# Patient Record
Sex: MALE | Race: WHITE | HISPANIC OR LATINO | Employment: UNEMPLOYED | URBAN - METROPOLITAN AREA
[De-identification: names, ages, dates, MRNs, and addresses within clinical notes are randomized per-mention and may not be internally consistent; named-entity substitution may affect disease eponyms.]

---
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
## 2024-01-10 ENCOUNTER — HOSPITAL ENCOUNTER (OUTPATIENT)
Dept: RESEARCH | Facility: CLINIC | Age: 19
Discharge: HOME | End: 2024-01-10
Attending: INTERNAL MEDICINE
Payer: COMMERCIAL

## 2024-01-10 PROCEDURE — 300N000003 HC RESEARCH SPECIMEN PROCESSING, SIMPLE

## 2024-01-10 PROCEDURE — 510N000009 HC RESEARCH FACILITY, PER 15 MIN

## 2024-01-10 PROCEDURE — 510N000017 HC CRU PATIENT CARE, PER 15 MIN

## 2024-01-10 NOTE — ADDENDUM NOTE
Encounter addended by: Lilly Pendleton on: 1/10/2024 3:02 PM   Actions taken: Charge Capture section accepted

## 2024-01-16 ENCOUNTER — MYC MEDICAL ADVICE (OUTPATIENT)
Dept: NEPHROLOGY | Facility: CLINIC | Age: 19
End: 2024-01-16
Payer: COMMERCIAL

## 2024-01-16 DIAGNOSIS — R80.1 PERSISTENT PROTEINURIA: ICD-10-CM

## 2024-01-16 DIAGNOSIS — Q87.81 X-LINKED ALPORT SYNDROME: Primary | ICD-10-CM

## 2024-01-17 ENCOUNTER — LAB (OUTPATIENT)
Dept: LAB | Facility: CLINIC | Age: 19
End: 2024-01-17
Payer: COMMERCIAL

## 2024-01-17 DIAGNOSIS — R80.1 PERSISTENT PROTEINURIA: ICD-10-CM

## 2024-01-17 DIAGNOSIS — Q87.81 X-LINKED ALPORT SYNDROME: ICD-10-CM

## 2024-01-17 LAB
ALBUMIN MFR UR ELPH: 121 MG/DL
ALBUMIN SERPL BCG-MCNC: 4.3 G/DL (ref 3.5–5.2)
ALBUMIN UR-MCNC: 100 MG/DL
ALP SERPL-CCNC: 63 U/L (ref 65–260)
ALT SERPL W P-5'-P-CCNC: 11 U/L (ref 0–50)
ANION GAP SERPL CALCULATED.3IONS-SCNC: 8 MMOL/L (ref 7–15)
APPEARANCE UR: CLEAR
AST SERPL W P-5'-P-CCNC: 24 U/L (ref 0–35)
BASOPHILS # BLD AUTO: 0.1 10E3/UL (ref 0–0.2)
BASOPHILS NFR BLD AUTO: 1 %
BILIRUB SERPL-MCNC: 0.6 MG/DL
BILIRUB UR QL STRIP: NEGATIVE
BUN SERPL-MCNC: 12.9 MG/DL (ref 6–20)
CALCIUM SERPL-MCNC: 9.1 MG/DL (ref 8.6–10)
CHLORIDE SERPL-SCNC: 102 MMOL/L (ref 98–107)
COLOR UR AUTO: ABNORMAL
CREAT SERPL-MCNC: 0.94 MG/DL (ref 0.67–1.17)
CREAT UR-MCNC: 55.3 MG/DL
DEPRECATED HCO3 PLAS-SCNC: 30 MMOL/L (ref 22–29)
EGFRCR SERPLBLD CKD-EPI 2021: >90 ML/MIN/1.73M2
EOSINOPHIL # BLD AUTO: 0.3 10E3/UL (ref 0–0.7)
EOSINOPHIL NFR BLD AUTO: 4 %
ERYTHROCYTE [DISTWIDTH] IN BLOOD BY AUTOMATED COUNT: 12.2 % (ref 10–15)
GLUCOSE SERPL-MCNC: 89 MG/DL (ref 70–99)
GLUCOSE UR STRIP-MCNC: NEGATIVE MG/DL
HCT VFR BLD AUTO: 41.1 % (ref 40–53)
HGB BLD-MCNC: 14.5 G/DL (ref 13.3–17.7)
HGB UR QL STRIP: ABNORMAL
IMM GRANULOCYTES # BLD: 0 10E3/UL
IMM GRANULOCYTES NFR BLD: 0 %
KETONES UR STRIP-MCNC: NEGATIVE MG/DL
LEUKOCYTE ESTERASE UR QL STRIP: NEGATIVE
LYMPHOCYTES # BLD AUTO: 2.2 10E3/UL (ref 0.8–5.3)
LYMPHOCYTES NFR BLD AUTO: 32 %
MCH RBC QN AUTO: 29.8 PG (ref 26.5–33)
MCHC RBC AUTO-ENTMCNC: 35.3 G/DL (ref 31.5–36.5)
MCV RBC AUTO: 84 FL (ref 78–100)
MONOCYTES # BLD AUTO: 0.5 10E3/UL (ref 0–1.3)
MONOCYTES NFR BLD AUTO: 8 %
NEUTROPHILS # BLD AUTO: 3.9 10E3/UL (ref 1.6–8.3)
NEUTROPHILS NFR BLD AUTO: 55 %
NITRATE UR QL: NEGATIVE
NRBC # BLD AUTO: 0 10E3/UL
NRBC BLD AUTO-RTO: 0 /100
PH UR STRIP: 7 [PH] (ref 5–7)
PLATELET # BLD AUTO: 182 10E3/UL (ref 150–450)
POTASSIUM SERPL-SCNC: 3.8 MMOL/L (ref 3.4–5.3)
PROT SERPL-MCNC: 6.3 G/DL (ref 6.4–8.3)
PROT/CREAT 24H UR: 2.19 MG/MG CR (ref 0–0.2)
RBC # BLD AUTO: 4.87 10E6/UL (ref 4.4–5.9)
RBC URINE: 68 /HPF
SODIUM SERPL-SCNC: 140 MMOL/L (ref 135–145)
SP GR UR STRIP: 1.01 (ref 1–1.03)
UROBILINOGEN UR STRIP-MCNC: NORMAL MG/DL
WBC # BLD AUTO: 6.9 10E3/UL (ref 4–11)
WBC URINE: <1 /HPF

## 2024-01-17 PROCEDURE — 36415 COLL VENOUS BLD VENIPUNCTURE: CPT | Performed by: PATHOLOGY

## 2024-01-17 PROCEDURE — 80053 COMPREHEN METABOLIC PANEL: CPT | Performed by: PATHOLOGY

## 2024-01-17 PROCEDURE — 81001 URINALYSIS AUTO W/SCOPE: CPT | Performed by: PATHOLOGY

## 2024-01-17 PROCEDURE — 85025 COMPLETE CBC W/AUTO DIFF WBC: CPT | Performed by: PATHOLOGY

## 2024-01-17 PROCEDURE — 84156 ASSAY OF PROTEIN URINE: CPT | Performed by: PATHOLOGY

## 2024-01-17 NOTE — TELEPHONE ENCOUNTER
Patient reached out wanting additional support on new ADHD meds his outside provider has started him on:   Adderall 10mg take 2 tabs daily or Adderall 20 ER daily (one or the other).  Patient concerned this med may have kidney issues and wondering if Dr. Aguilar has any thoughts on this med versus Ritalin or other option.  Patient has been taking the short acting one for 10days or so and states doing ok with that at this time.  Reviewed last note with patient and Dr. Aguilar's request for labs in Dec, encouraged to get now so we can see his kidney status at this time.   Patient available today to get labs drawn, scheduled [place] [facility] [place] [facility] for 345pm today, confirmed date and time with patient as well as location.    Confirmed with patient that he has not started his Farxiga yet as his insurance has denied it thus far.  Patient is agreeable for additional support to get auth completed.  [facility] referral placed and scheduled appt with Kayleigh Armstrong in am.  Patient agreeable to labs today and pharmacy video visit tomorrow am.  Plan: will [place] back with patient later this week or early next week on lab results and Dr. Aguilar response to patient's request after clinic later this week.  Patient verbalized understanding to directions and agreeable to plan of care.      Reached out to Dr. Aguilar to confirm labs orders to be drawn today.  Updated lab orders to include CBC, CMP to already Protein Random, UA.  Also updated Dr. Aguilar Farxiga has not been started because of barriers on getting auth so set him up with [facility] to get things ironed out.    Faye Trevino RN, BSN, PHN  [facility] Nephrology Nurse Navigator  [phone]

## 2024-01-18 ENCOUNTER — VIRTUAL VISIT (OUTPATIENT)
Dept: RHEUMATOLOGY | Facility: CLINIC | Age: 19
End: 2024-01-18
Attending: INTERNAL MEDICINE
Payer: COMMERCIAL

## 2024-01-18 DIAGNOSIS — Z91.018 FOOD ALLERGY: ICD-10-CM

## 2024-01-18 DIAGNOSIS — L20.9 ATOPIC DERMATITIS, UNSPECIFIED TYPE: ICD-10-CM

## 2024-01-18 DIAGNOSIS — R80.1 PERSISTENT PROTEINURIA, UNSPECIFIED: ICD-10-CM

## 2024-01-18 DIAGNOSIS — Z71.85 VACCINE COUNSELING: ICD-10-CM

## 2024-01-18 DIAGNOSIS — F90.9 ATTENTION DEFICIT HYPERACTIVITY DISORDER (ADHD), UNSPECIFIED ADHD TYPE: ICD-10-CM

## 2024-01-18 DIAGNOSIS — J30.2 SEASONAL ALLERGIC RHINITIS, UNSPECIFIED TRIGGER: ICD-10-CM

## 2024-01-18 DIAGNOSIS — Q87.81 X-LINKED ALPORT SYNDROME: Primary | ICD-10-CM

## 2024-01-18 RX ORDER — DEXTROAMPHETAMINE SACCHARATE, AMPHETAMINE ASPARTATE MONOHYDRATE, DEXTROAMPHETAMINE SULFATE AND AMPHETAMINE SULFATE 5; 5; 5; 5 MG/1; MG/1; MG/1; MG/1
20 CAPSULE, EXTENDED RELEASE ORAL DAILY PRN
COMMUNITY
Start: 2024-01-12

## 2024-01-18 RX ORDER — DEXTROAMPHETAMINE SACCHARATE, AMPHETAMINE ASPARTATE, DEXTROAMPHETAMINE SULFATE AND AMPHETAMINE SULFATE 2.5; 2.5; 2.5; 2.5 MG/1; MG/1; MG/1; MG/1
TABLET ORAL
COMMUNITY
Start: 2024-01-02

## 2024-01-18 RX ORDER — DAPAGLIFLOZIN 5 MG/1
5 TABLET, FILM COATED ORAL DAILY
Qty: 30 TABLET | Refills: 11 | Status: SHIPPED | OUTPATIENT
Start: 2024-01-18

## 2024-01-18 NOTE — PATIENT INSTRUCTIONS
"Recommendations from today's MTM visit:                                                       1. Write down the BIN, PCN, ID number, and Group number from the Farxiga copay card and take this to [facility]. The pharmacy should be able to manually add this information which should decrease they price by $150.    2. Your Adderall is safe to take with your current kidney function. If your kidney function decreases, we may need to decrease your dose.    3. Consider getting your yearly flu vaccine and updated covid booster.    Follow-up: Return in about 6 months (around 7/18/2024) for MTM Pharmacist Visit.    It was great speaking with you today.  I value your experience and would be very thankful for your time in providing feedback in our clinic survey. In the next few days, you may receive an email or text message from [employer] with a link to a survey related to your  clinical pharmacist.\"     To schedule another MTM appointment, please call the clinic directly or you may call the MTM scheduling line at [phone].    My Clinical Pharmacist's contact information:                                                      Please feel free to contact me with any questions or concerns you have.      Kayleigh Armstrong, PharmD  Medication Therapy Management Pharmacist  [facility]  Phone: [phone]     "

## 2024-01-18 NOTE — Clinical Note
1/18/2024       RE: Valentin Gaytan  [address] [facility] [address]     Dear Colleague,    Thank you for referring your patient, Valentin Gaytan, to the [facility] [place] [facility]. Please see a copy of my visit note below.    Medication Therapy Management (MTM) Encounter    ASSESSMENT:                            Medication Adherence/Access: {adherencechoices:708226}    ***:  ***      PLAN:                            ***    Follow-up: {followuptest2:488457}    SUBJECTIVE/OBJECTIVE:                          Valentin Gaytan is a 18 year old male contacted via secure video for an initial visit. He was referred to me from Prasanna Aguilar MD.      Reason for visit: Questions about cost of Farxiga, if Adderall is damaging to renal health    Allergies/ADRs: Reviewed in chart  Past Medical History: Reviewed in chart  Tobacco: He reports that he has never smoked. He does not have any smokeless tobacco history on file.  Alcohol: {ALCOHOL CONSUMPTION HX:026701}  {Social and Goals:965820}    Medication Adherence/Access: Medication barriers: affording medications.     Alport Syndrome/Proteinuria:   Losartan 12.5 mg daily  Farxiga 5 mg daily (not currently taking)    ADHD:  Adderall XR 20 mg daily as needed   Adderall 10 mg 1/2 to 2 tablets twice daily (4 hours apart) as needed     Atopic Dermatitis:  Clobetasone 0.05% shampoo twice weekly  Ketoconazole 2% shampoo twice weekly  Mometasone 0.1% solution twice weekly    Allergic Rhinitis/Food Allergies:   Azelastine nasal spray - 1 spray(s) each nostril twice daily  Cetirizine 20 mg as needed for severe allergic reaction  Epinephrine 0.3 mg as needed for severe allergic reaction  Prednisone 100 mg as needed for severe allergic reaction  Patient reports {:695126}.    {allergy:323928}   Patient feels that current therapy {IS NOT/IS:235205} effective.     Vaccines:  Immunization History   Administered  "Date(s) Administered    COVID-19 Bivalent 12+ (Pfizer) 10/24/2022    COVID-19 MONOVALENT 12+ (Pfizer) 12/13/2021    Influenza Vaccine >6 months,quad, PF 11/15/2022      Today's Vitals: There were no vitals taken for this visit.  ----------------  {ESME?:966982}    I spent {mtm total time 3:229750} with this patient today. { :278288}. A copy of the visit note was provided to the patient's provider(s).    A summary of these recommendations {GIVEN/NOT GIVEN:098111}.    ***    Telemedicine Visit Details  Type of service:  {telemedvisitmtm:725920::\"Telephone visit\"}  Start Time: {video/phone visit start time:152948}  End Time: {video/phone visit end time:152948}     Medication Therapy Recommendations  No medication therapy recommendations to display       Medication Therapy Management (MTM) Encounter    ASSESSMENT:                            Medication Adherence/Access: See below for considerations    Alport Syndrome/Proteinuria: Reviewed use of SGLT-2 inhibitors for chronic kidney disease and persistent proteinuria. Determined both Jardiance and Farxiga are over a $500 cost per month until deductible is met per insurance formulary. Discussed Farxiga copay card and information pharmacy will need to use card. Encouraged patient to write down needed numbers and request the pharmacy manually enter the information to reduce cost by a maximum of $150. Reviewed patient should contact MTM if pharmacy is unable to process copay card and we will look for alternate pharmacy options. Discussed he may consider looking into insurance plan to determine deductible and out of pocket maximum as cost will decrease once these are met.    ADHD:  Reviewed Adderall when used in patients with typical renal function poses no risk of renal toxicity however if it is used in patients that have an underlying renal insufficiency, elimination can be impaired and lower doses may be needed. Discussed that with patients current kidney function, no dose " adjustment is needed. If renal function worsens in future, will need to discuss dose decrease or alternate medication options.    Atopic Dermatitis: Stable.    Allergic Rhinitis/Food Allergies: Stable.    Vaccines: Per ACIP recommendations, eligible for yearly influenza vaccine and updated covid booster.    PLAN:                            1. Write down the BIN, PCN, ID number, and Group number from the Farxiga copay card and take this to [facility]. The pharmacy should be able to manually add this information which should decrease they price by $150.    2. Your Adderall is safe to take with your current kidney function. If your kidney function decreases, we may need to decrease your dose.    3. Consider getting your yearly flu vaccine and updated covid booster.    Follow-up: Return in about 6 months (around 7/18/2024) for MTM Pharmacist Visit.    SUBJECTIVE/OBJECTIVE:                          Valentin Gaytan is a 18 year old male contacted via secure video for an initial visit. He was referred to me from Prasanna Aguilar MD.      Reason for visit: Questions about cost of Farxiga, if Adderall is damaging to renal health    Allergies/ADRs: Reviewed in chart  Past Medical History: Reviewed in chart  Tobacco: He reports that he has never smoked. He does not have any smokeless tobacco history on file.  Alcohol: not currently using, has had alcohol previously    Medication Adherence/Access: Medication barriers: affording medications. Reports Farxiga was over $500 through insurance when he attempted to fill it a few weeks ago. Does have a copay card but pharmacy was unable to use it.     Alport Syndrome/Proteinuria:   Losartan 12.5 mg daily  Farxiga 5 mg daily (not currently taking)    Reports he is doing well on his losartan. Is still a little lightheaded when changing positions quickly but much better tolerated than some of the medications he has tried previously. Was told he should start Farxiga due to  proteinuria but it was very expensive at the pharmacy when he tried to fill it and they were unable to accept the pdf of this coupon card for it. Still on parents insurance which he knows is a high deductible plan. Has not looked at pricing for other SGLT2 options.    BP Readings from Last 3 Encounters:   07/12/23 124/77   05/31/23 104/70   10/19/22 124/60 (66%, Z = 0.41 /  14%, Z = -1.08)*     *BP percentiles are based on the 2017 AAP Clinical Practice Guideline for boys     Creatinine   Date Value Ref Range Status   01/17/2024 0.94 0.67 - 1.17 mg/dL Final     ADHD:  Adderall XR 20 mg daily as needed   Adderall 10 mg 1/2 to 2 tablets twice daily (4 hours apart) as needed     Reports he started these medications ~1 week ago and has already seen significant improvement in his symptoms. No side effects noted. Did read some literature about how Adderall could be an unsafe medication to use in individuals with CKD. Wondering if this is a safe medication for him to be taking or if he should switch to a different medication to treat his symptoms.     Atopic Dermatitis:  Clobetasone 0.05% shampoo twice weekly  Ketoconazole 2% shampoo twice weekly  Mometasone 0.1% solution twice weekly    Feels medications are working well - no side effects or concerns noted.    Allergic Rhinitis/Food Allergies:   Azelastine nasal spray - 1 spray(s) each nostril twice daily  Cetirizine 20 mg as needed for severe allergic reaction  Epinephrine 0.3 mg as needed for severe allergic reaction  Prednisone 100 mg as needed for severe allergic reaction    Patient reports no current medication side effects. Patient feels that current therapy is effective.     Vaccines: Due for yearly influenza vaccine and updated covid booster.  Immunization History   Administered Date(s) Administered     COVID-19 Bivalent 12+ (Pfizer) 10/24/2022     COVID-19 MONOVALENT 12+ (Pfizer) 12/13/2021     Influenza Vaccine >6 months,quad, PF 11/15/2022      Today's Vitals:  There were no vitals taken for this visit.  ----------------    I spent 30 minutes with this patient today. All changes were made via collaborative practice agreement with Prasanna Aguilar. A copy of the visit note was provided to the patient's provider(s).    A summary of these recommendations was sent via [employer].    Deidra Armstrong PharmD  Medication Therapy Management Pharmacist  [facility]  Phone: [phone]    Telemedicine Visit Details  Type of service:  Video Conference via [employer]  Start Time: 8:00 AM  End Time: 8:30 AM     Medication Therapy Recommendations  No medication therapy recommendations to display         Again, thank you for allowing me to participate in the care of your patient.      Sincerely,    DEIDRA ARMSTRONG RPH

## 2024-01-18 NOTE — PROGRESS NOTES
Medication Therapy Management (MTM) Encounter    ASSESSMENT:                            Medication Adherence/Access: See below for considerations    Alport Syndrome/Proteinuria: Reviewed use of SGLT-2 inhibitors for chronic kidney disease and persistent proteinuria. Determined both Jardiance and Farxiga are over a $500 cost per month until deductible is met per insurance formulary. Discussed Farxiga copay card and information pharmacy will need to use card. Encouraged patient to write down needed numbers and request the pharmacy manually enter the information to reduce cost by a maximum of $150. Reviewed patient should contact MTM if pharmacy is unable to process copay card and we will look for alternate pharmacy options. Discussed he may consider looking into insurance plan to determine deductible and out of pocket maximum as cost will decrease once these are met.    ADHD:  Reviewed Adderall when used in patients with typical renal function poses no risk of renal toxicity however if it is used in patients that have an underlying renal insufficiency, elimination can be impaired and lower doses may be needed. Discussed that with patients current kidney function, no dose adjustment is needed. If renal function worsens in future, will need to discuss dose decrease or alternate medication options.    Atopic Dermatitis: Stable.    Allergic Rhinitis/Food Allergies: Stable.    Vaccines: Per ACIP recommendations, eligible for yearly influenza vaccine and updated covid booster.    PLAN:                            1. Write down the BIN, PCN, ID number, and Group number from the Farxiga copay card and take this to [facility]. The pharmacy should be able to manually add this information which should decrease they price by $150.    2. Your Adderall is safe to take with your current kidney function. If your kidney function decreases, we may need to decrease your dose.    3. Consider getting your yearly flu vaccine  and updated covid booster.    Follow-up: Return in about 6 months (around 7/18/2024) for MTM Pharmacist Visit.    SUBJECTIVE/OBJECTIVE:                          Valentin Gaytan is a 18 year old male contacted via secure video for an initial visit. He was referred to me from Prasanna Aguilar MD.      Reason for visit: Questions about cost of Farxiga, if Adderall is damaging to renal health    Allergies/ADRs: Reviewed in chart  Past Medical History: Reviewed in chart  Tobacco: He reports that he has never smoked. He does not have any smokeless tobacco history on file.  Alcohol: not currently using, has had alcohol previously    Medication Adherence/Access: Medication barriers: affording medications. Reports Farxiga was over $500 through insurance when he attempted to fill it a few weeks ago. Does have a copay card but pharmacy was unable to use it.     Alport Syndrome/Proteinuria:   Losartan 12.5 mg daily  Farxiga 5 mg daily (not currently taking)    Reports he is doing well on his losartan. Is still a little lightheaded when changing positions quickly but much better tolerated than some of the medications he has tried previously. Was told he should start Farxiga due to proteinuria but it was very expensive at the pharmacy when he tried to fill it and they were unable to accept the pdf of this coupon card for it. Still on parents insurance which he knows is a high deductible plan. Has not looked at pricing for other SGLT2 options.    BP Readings from Last 3 Encounters:   07/12/23 124/77   05/31/23 104/70   10/19/22 124/60 (66%, Z = 0.41 /  14%, Z = -1.08)*     *BP percentiles are based on the 2017 AAP Clinical Practice Guideline for boys     Creatinine   Date Value Ref Range Status   01/17/2024 0.94 0.67 - 1.17 mg/dL Final     ADHD:  Adderall XR 20 mg daily as needed   Adderall 10 mg 1/2 to 2 tablets twice daily (4 hours apart) as needed     Reports he started these medications ~1 week ago and has already seen  significant improvement in his symptoms. No side effects noted. Did read some literature about how Adderall could be an unsafe medication to use in individuals with CKD. Wondering if this is a safe medication for him to be taking or if he should switch to a different medication to treat his symptoms.     Atopic Dermatitis:  Clobetasone 0.05% shampoo twice weekly  Ketoconazole 2% shampoo twice weekly  Mometasone 0.1% solution twice weekly    Feels medications are working well - no side effects or concerns noted.    Allergic Rhinitis/Food Allergies:   Azelastine nasal spray - 1 spray(s) each nostril twice daily  Cetirizine 20 mg as needed for severe allergic reaction  Epinephrine 0.3 mg as needed for severe allergic reaction  Prednisone 100 mg as needed for severe allergic reaction    Patient reports no current medication side effects. Patient feels that current therapy is effective.     Vaccines: Due for yearly influenza vaccine and updated covid booster.  Immunization History   Administered Date(s) Administered    COVID-19 Bivalent 12+ (Pfizer) 10/24/2022    COVID-19 MONOVALENT 12+ (Pfizer) 12/13/2021    Influenza Vaccine >6 months,quad, PF 11/15/2022      Today's Vitals: There were no vitals taken for this visit.  ----------------    I spent 30 minutes with this patient today. All changes were made via collaborative practice agreement with Prasanna Aguilar. A copy of the visit note was provided to the patient's provider(s).    A summary of these recommendations was sent via [employer].    Kanika BasurtoD  Medication Therapy Management Pharmacist  [facility]  Phone: [phone]    Telemedicine Visit Details  Type of service:  Video Conference via [employer]  Start Time: 8:00 AM  End Time: 8:30 AM     Medication Therapy Recommendations  Attention deficit hyperactivity disorder (ADHD), unspecified ADHD type    Current Medication: amphetamine-dextroamphetamine (ADDERALL XR) 20 MG 24 hr  capsule   Rationale: Does not understand instructions - Adherence - Adherence   Recommendation: Provide Education - Does not impact kidney health unless renal function worsens in the future   Status: Patient Agreed - Adherence/Education         Persistent proteinuria, unspecified    Current Medication: dapagliflozin (FARXIGA) 5 MG TABS tablet   Rationale: Cannot afford medication product - Cost - Adherence   Recommendation: Referral to Service  - Take copay card numbers to pharmacy and ask them to enter them manually   Status: Resolved Med Access Issue

## 2024-01-19 NOTE — TELEPHONE ENCOUNTER
Follow up lab results reviewed with Dr. Aguilar, labs look stable.  Updated Dr. Agiular that [facility] was able to support patient getting started on Farxiga, coupon card to support high co-pay premiums.  Dr. Aguilar request CMP done 1-2 weeks after Farxiga started.    Called to patient updating him that his lab results look stable and Dr. Aguilar would like to get more labs done 1-2 weeks after Farxiga is started.  Patient updated that medication confirmed ready for  on Monday.  Patient to  and start with Monday evening meds.  Labs scheduled for 1/31 as patient schedule allows.  Follow up clinic appt scheduled 4/4 with labs the day before on 4/3 all scheduled.  Ordered CMP and pre-clinic labs are per T.O. Dr. Aguilar. Linked labs to appropriate scheduled lab appts.  Patient denies further questions, agreeable to plan of care, and will follow up with any questions or concerns prior to follow up appt.  Plan: follow up with next lab results.  Faye Trevino RN, BSN, PHN  [facility] Nephrology Nurse Navigator  [phone]

## 2024-01-29 ENCOUNTER — PATIENT OUTREACH (OUTPATIENT)
Dept: NEPHROLOGY | Facility: CLINIC | Age: 19
End: 2024-01-29

## 2024-01-29 NOTE — PROGRESS NOTES
Voicemail received from patient updating that he received the Farxiga today and will start the med tomorrow, Tuesday.  Patient is needing the lab for this Wednesday pushed to next week to allow time to see if med works before recheck.

## 2024-01-29 NOTE — PROGRESS NOTES
Message left for patient after reviewing last note to reschedule lab for 4 weeks after starting dose, around Feb 28th or around that time.    Instructed patient to call back to confirm ok for Feb 28th at 1130am or reply to [employer] if another date or time works better.    Faye Trevino RN, BSN, PHN  [facility] Nephrology Nurse Navigator  [phone]

## 2024-01-30 NOTE — PROGRESS NOTES
Update received from patient for ok to switch appt to Feb 28th.   Appt switched and [employer] message sent of updated lab appt.    Faye Trevino RN, BSN, PHN  [facility] Nephrology Nurse Navigator  [phone]

## 2024-02-12 ENCOUNTER — PATIENT OUTREACH (OUTPATIENT)
Dept: NEPHROLOGY | Facility: CLINIC | Age: 19
End: 2024-02-12
Payer: COMMERCIAL

## 2024-02-12 DIAGNOSIS — Q87.81 X-LINKED ALPORT SYNDROME: ICD-10-CM

## 2024-02-12 DIAGNOSIS — R80.1 PERSISTENT PROTEINURIA: ICD-10-CM

## 2024-02-12 RX ORDER — LOSARTAN POTASSIUM 25 MG/1
12.5 TABLET ORAL DAILY
Qty: 45 TABLET | Refills: 3 | Status: SHIPPED | OUTPATIENT
Start: 2024-02-12

## 2024-02-12 NOTE — PROGRESS NOTES
Patient is calling to update that he is doing well with Farxiga, no side effects or other complications noted at this time.  Requesting refill of Losartan 12.5mg daily.  Confirmed follow up appts scheduled, no other refills needed, labs ordered and linked to appts.  Patient denies any additional questions or supported needed at this time.     [facility] Nephrology Note: Medication Refill Request    Medication Refill Request:     Medication/Dose/Frequency: Losartan 12.5mg daily  Preferred Pharmacy: [facility]  Provider:  Jeff                   SITUATION/BACKROUND:             Last neph office visit: 12/1/23      Future neph office visit: 4/4/24    ASSESSMENT:     Neph Assessments:    Recent Labs:    Last Renal Panel:  Sodium   Date Value Ref Range Status   01/17/2024 140 135 - 145 mmol/L Final     Comment:     Reference intervals for this test were updated on 09/26/2023 to more accurately reflect our healthy population. There may be differences in the flagging of prior results with similar values performed with this method. Interpretation of those prior results can be made in the context of the updated reference intervals.      Potassium   Date Value Ref Range Status   01/17/2024 3.8 3.4 - 5.3 mmol/L Final   10/19/2022 4.1 3.4 - 5.3 mmol/L Final     Chloride   Date Value Ref Range Status   01/17/2024 102 98 - 107 mmol/L Final   10/19/2022 102 98 - 110 mmol/L Final     Carbon Dioxide (CO2)   Date Value Ref Range Status   01/17/2024 30 (H) 22 - 29 mmol/L Final   10/19/2022 33 (H) 20 - 32 mmol/L Final     Anion Gap   Date Value Ref Range Status   01/17/2024 8 7 - 15 mmol/L Final   10/19/2022 5 3 - 14 mmol/L Final     Glucose   Date Value Ref Range Status   01/17/2024 89 70 - 99 mg/dL Final   10/19/2022 90 70 - 99 mg/dL Final     Urea Nitrogen   Date Value Ref Range Status   01/17/2024 12.9 6.0 - 20.0 mg/dL Final   10/19/2022 11 7 - 21 mg/dL Final     Creatinine   Date Value Ref Range  Status   01/17/2024 0.94 0.67 - 1.17 mg/dL Final     GFR Estimate   Date Value Ref Range Status   01/17/2024 >90 >60 mL/min/1.73m2 Final     Calcium   Date Value Ref Range Status   01/17/2024 9.1 8.6 - 10.0 mg/dL Final     Phosphorus   Date Value Ref Range Status   05/31/2023 3.3 2.5 - 4.5 mg/dL Final     Albumin   Date Value Ref Range Status   01/17/2024 4.3 3.5 - 5.2 g/dL Final   10/19/2022 3.8 3.4 - 5.0 g/dL Final       Liver Function Studies:  Recent Labs   Lab Test 01/17/24  1600   PROTTOTAL 6.3*   ALBUMIN 4.3   BILITOTAL 0.6   ALKPHOS 63*   AST 24   ALT 11     CBC Results:  Recent Labs   Lab Test 01/17/24  1600   WBC 6.9   RBC 4.87   HGB 14.5   HCT 41.1   MCV 84   MCH 29.8   MCHC 35.3   RDW 12.2        Urinalysis:  Color Urine (no units)   Date Value   01/17/2024 Straw     Appearance Urine (no units)   Date Value   01/17/2024 Clear     Glucose Urine (mg/dL)   Date Value   01/17/2024 Negative     Bilirubin Urine (no units)   Date Value   01/17/2024 Negative     Ketones Urine (mg/dL)   Date Value   01/17/2024 Negative     Specific Gravity Urine (no units)   Date Value   01/17/2024 1.008     pH Urine (no units)   Date Value   01/17/2024 7.0     Protein Albumin Urine (mg/dL)   Date Value   01/17/2024 100 (A)     Nitrite Urine (no units)   Date Value   01/17/2024 Negative     Leukocyte Esterase Urine (no units)   Date Value   01/17/2024 Negative         Current Medication List:   Current Outpatient Medications (Antihypertensive, Cardiovascular, Diuretics, Beta blockers, Calcium blockers, Anticoagulants)   Medication Sig    losartan (COZAAR) 25 MG tablet Take 0.5 tablets (12.5 mg) by mouth daily     Current Outpatient Medications (Other)   Medication Sig    amphetamine-dextroamphetamine (ADDERALL XR) 20 MG 24 hr capsule Take 20 mg by mouth daily as needed    amphetamine-dextroamphetamine (ADDERALL) 10 MG tablet TAKE 1/2 TO 2 TABLETS BY MOUTH TWICE DAILY (4 HOURS APART) AS NEEDED FOR ADHD. *MAX 2 TABS PER DAY*     azelastine (ASTELIN) 0.1 % nasal spray Spray 1 spray into both nostrils 2 times daily    cetirizine (ZYRTEC) 10 MG tablet Emergency set: if severe allergic reaction take immediately 100mg Prednisone (2x50mg) and 2 Tabl Cetirizine 10mg and then write precise 12h retrospective diary. If less severe reaction take only the 2 Tabl Cetirizine. Please provide patient with key chain box for these emergency medications    clobetasol propionate (CLOBEX) 0.05 % external shampoo 2x weekly for hair wash    dapagliflozin (FARXIGA) 5 MG TABS tablet Take 1 tablet (5 mg) by mouth daily    EPINEPHrine (ANY BX GENERIC EQUIV) 0.3 MG/0.3ML injection 2-pack Inject 0.3 mLs (0.3 mg) into the muscle as needed for anaphylaxis May repeat one time in 5-15 minutes if response to initial dose is inadequate.    ketoconazole (NIZORAL) 2 % external shampoo Apply topically twice a week 2 times weekly    mometasone (ELOCON) 0.1 % external solution Apply topically twice a week    predniSONE (DELTASONE) 50 MG tablet Emergency set: if severe allergic reaction take immediately 100mg Prednisone (2x50mg) and 2 Tabl Cetirizine 10mg and then write precise 12h retrospective diary. If less severe reaction take only the 2 Tabl Cetirizine. Please provide patient with key chain box for these emergency medications       Has patient had kidney transplant in the prior 1 year: no.   Has patient been seen the last 12 months: Yes.  Associated labs reviewed for medication: Yes    PLAN:     Medication refilled per protocol: Yes  Faye Trevino RN, BSN, PHN  [facility] Nephrology Nurse Navigator  [phone]

## 2024-02-24 ENCOUNTER — HEALTH MAINTENANCE LETTER (OUTPATIENT)
Age: 19
End: 2024-02-24

## 2024-02-28 ENCOUNTER — LAB (OUTPATIENT)
Dept: LAB | Facility: CLINIC | Age: 19
End: 2024-02-28
Payer: COMMERCIAL

## 2024-02-28 DIAGNOSIS — Q87.81 X-LINKED ALPORT SYNDROME: ICD-10-CM

## 2024-02-28 DIAGNOSIS — R80.1 PERSISTENT PROTEINURIA: ICD-10-CM

## 2024-02-28 LAB
ALBUMIN SERPL BCG-MCNC: 4.4 G/DL (ref 3.5–5.2)
ALP SERPL-CCNC: 65 U/L (ref 65–260)
ALT SERPL W P-5'-P-CCNC: 15 U/L (ref 0–50)
ANION GAP SERPL CALCULATED.3IONS-SCNC: 9 MMOL/L (ref 7–15)
AST SERPL W P-5'-P-CCNC: 25 U/L (ref 0–35)
BILIRUB SERPL-MCNC: 1 MG/DL
BUN SERPL-MCNC: 16 MG/DL (ref 6–20)
CALCIUM SERPL-MCNC: 9.5 MG/DL (ref 8.6–10)
CHLORIDE SERPL-SCNC: 101 MMOL/L (ref 98–107)
CREAT SERPL-MCNC: 0.93 MG/DL (ref 0.67–1.17)
DEPRECATED HCO3 PLAS-SCNC: 30 MMOL/L (ref 22–29)
EGFRCR SERPLBLD CKD-EPI 2021: >90 ML/MIN/1.73M2
GLUCOSE SERPL-MCNC: 102 MG/DL (ref 70–99)
PHOSPHATE SERPL-MCNC: 4.2 MG/DL (ref 2.5–4.5)
POTASSIUM SERPL-SCNC: 4.5 MMOL/L (ref 3.4–5.3)
PROT SERPL-MCNC: 6.6 G/DL (ref 6.4–8.3)
SODIUM SERPL-SCNC: 140 MMOL/L (ref 135–145)

## 2024-02-28 PROCEDURE — 80053 COMPREHEN METABOLIC PANEL: CPT | Performed by: PATHOLOGY

## 2024-02-28 PROCEDURE — 84100 ASSAY OF PHOSPHORUS: CPT | Performed by: PATHOLOGY

## 2024-02-28 PROCEDURE — 36415 COLL VENOUS BLD VENIPUNCTURE: CPT | Performed by: PATHOLOGY

## 2024-03-01 ENCOUNTER — PATIENT OUTREACH (OUTPATIENT)
Dept: NEPHROLOGY | Facility: CLINIC | Age: 19
End: 2024-03-01
Payer: COMMERCIAL

## 2024-03-01 NOTE — PROGRESS NOTES
[facility] Note: Patient Outreach Encounter    REASON FOR CALL:     REASON FOR CALL: Follow up labs after change in plan of care.                     SITUATION/BACKROUND:   Patient is being treated for Alport Syndrome    Per provider instruction, writer to follow up on lab results.    ASSESSMENT:     Updated patient via [employer] that labs are stable and requested check in on status.    Nurse Assessments:  Recent Labs      Latest Ref Rng & Units 2/28/2024 1/17/2024 5/31/2023   Neph Labs   Sodium 135 - 145 mmol/L 140  140  141    GFR Estimate >60 mL/min/1.73m2 >90  >90  >90    Potassium 3.4 - 5.3 mmol/L 4.5  3.8  4.4    Creatinine 0.67 - 1.17 mg/dL 0.93  0.94  0.84    Urea Nitrogen 6.0 - 20.0 mg/dL 16.0  12.9  9.5    Hemoglobin 13.3 - 17.7 g/dL  14.5  15.0          PLAN:     Follow Up:   Instructed patient via [employer] to let us know  call/[employer] message with updates prior to scheduled follow up.    Faye Trevino RN  [facility]: [phone]

## 2024-04-03 ENCOUNTER — LAB (OUTPATIENT)
Dept: LAB | Facility: CLINIC | Age: 19
End: 2024-04-03
Payer: COMMERCIAL

## 2024-04-03 DIAGNOSIS — Q87.81 X-LINKED ALPORT SYNDROME: ICD-10-CM

## 2024-04-03 DIAGNOSIS — R80.1 PERSISTENT PROTEINURIA: ICD-10-CM

## 2024-04-03 LAB
ALBUMIN MFR UR ELPH: 734 MG/DL
ALBUMIN SERPL BCG-MCNC: 4.4 G/DL (ref 3.5–5.2)
ALBUMIN UR-MCNC: 300 MG/DL
ALP SERPL-CCNC: 69 U/L (ref 65–260)
ALT SERPL W P-5'-P-CCNC: 10 U/L (ref 0–50)
ANION GAP SERPL CALCULATED.3IONS-SCNC: 10 MMOL/L (ref 7–15)
APPEARANCE UR: CLEAR
AST SERPL W P-5'-P-CCNC: 24 U/L (ref 0–35)
BASOPHILS # BLD AUTO: 0.1 10E3/UL (ref 0–0.2)
BASOPHILS NFR BLD AUTO: 1 %
BILIRUB SERPL-MCNC: 0.7 MG/DL
BILIRUB UR QL STRIP: NEGATIVE
BUN SERPL-MCNC: 10.7 MG/DL (ref 6–20)
CALCIUM SERPL-MCNC: 9.5 MG/DL (ref 8.6–10)
CHLORIDE SERPL-SCNC: 102 MMOL/L (ref 98–107)
COLOR UR AUTO: YELLOW
CREAT SERPL-MCNC: 0.98 MG/DL (ref 0.67–1.17)
CREAT UR-MCNC: 310 MG/DL
DEPRECATED HCO3 PLAS-SCNC: 30 MMOL/L (ref 22–29)
EGFRCR SERPLBLD CKD-EPI 2021: >90 ML/MIN/1.73M2
EOSINOPHIL # BLD AUTO: 0.3 10E3/UL (ref 0–0.7)
EOSINOPHIL NFR BLD AUTO: 5 %
ERYTHROCYTE [DISTWIDTH] IN BLOOD BY AUTOMATED COUNT: 12.4 % (ref 10–15)
GLUCOSE SERPL-MCNC: 93 MG/DL (ref 70–99)
GLUCOSE UR STRIP-MCNC: 1000 MG/DL
HCT VFR BLD AUTO: 45.6 % (ref 40–53)
HGB BLD-MCNC: 16.1 G/DL (ref 13.3–17.7)
HGB UR QL STRIP: ABNORMAL
IMM GRANULOCYTES # BLD: 0 10E3/UL
IMM GRANULOCYTES NFR BLD: 0 %
KETONES UR STRIP-MCNC: NEGATIVE MG/DL
LEUKOCYTE ESTERASE UR QL STRIP: NEGATIVE
LYMPHOCYTES # BLD AUTO: 2.3 10E3/UL (ref 0.8–5.3)
LYMPHOCYTES NFR BLD AUTO: 32 %
MCH RBC QN AUTO: 30.3 PG (ref 26.5–33)
MCHC RBC AUTO-ENTMCNC: 35.3 G/DL (ref 31.5–36.5)
MCV RBC AUTO: 86 FL (ref 78–100)
MONOCYTES # BLD AUTO: 0.7 10E3/UL (ref 0–1.3)
MONOCYTES NFR BLD AUTO: 9 %
MUCOUS THREADS #/AREA URNS LPF: PRESENT /LPF
NEUTROPHILS # BLD AUTO: 3.8 10E3/UL (ref 1.6–8.3)
NEUTROPHILS NFR BLD AUTO: 53 %
NITRATE UR QL: NEGATIVE
NRBC # BLD AUTO: 0 10E3/UL
NRBC BLD AUTO-RTO: 0 /100
PH UR STRIP: 6.5 [PH] (ref 5–7)
PLATELET # BLD AUTO: 178 10E3/UL (ref 150–450)
POTASSIUM SERPL-SCNC: 3.8 MMOL/L (ref 3.4–5.3)
PROT SERPL-MCNC: 6.9 G/DL (ref 6.4–8.3)
PROT/CREAT 24H UR: 2.37 MG/MG CR (ref 0–0.2)
RBC # BLD AUTO: 5.31 10E6/UL (ref 4.4–5.9)
RBC URINE: >182 /HPF
SODIUM SERPL-SCNC: 142 MMOL/L (ref 135–145)
SP GR UR STRIP: 1.03 (ref 1–1.03)
SQUAMOUS EPITHELIAL: <1 /HPF
UROBILINOGEN UR STRIP-MCNC: NORMAL MG/DL
WBC # BLD AUTO: 7.2 10E3/UL (ref 4–11)
WBC URINE: 2 /HPF

## 2024-04-03 PROCEDURE — 81001 URINALYSIS AUTO W/SCOPE: CPT | Performed by: PATHOLOGY

## 2024-04-03 PROCEDURE — 84156 ASSAY OF PROTEIN URINE: CPT | Performed by: PATHOLOGY

## 2024-04-03 PROCEDURE — 36415 COLL VENOUS BLD VENIPUNCTURE: CPT | Performed by: PATHOLOGY

## 2024-04-03 PROCEDURE — 85025 COMPLETE CBC W/AUTO DIFF WBC: CPT | Performed by: PATHOLOGY

## 2024-04-03 PROCEDURE — 80053 COMPREHEN METABOLIC PANEL: CPT | Performed by: PATHOLOGY

## 2024-04-04 ENCOUNTER — OFFICE VISIT (OUTPATIENT)
Dept: NEPHROLOGY | Facility: CLINIC | Age: 19
End: 2024-04-04
Attending: INTERNAL MEDICINE
Payer: COMMERCIAL

## 2024-04-04 VITALS
BODY MASS INDEX: 19.91 KG/M2 | DIASTOLIC BLOOD PRESSURE: 60 MMHG | HEIGHT: 72 IN | TEMPERATURE: 97.5 F | WEIGHT: 147 LBS | OXYGEN SATURATION: 99 % | SYSTOLIC BLOOD PRESSURE: 106 MMHG | HEART RATE: 68 BPM

## 2024-04-04 DIAGNOSIS — Q87.81 X-LINKED ALPORT SYNDROME: Primary | ICD-10-CM

## 2024-04-04 PROCEDURE — 99213 OFFICE O/P EST LOW 20 MIN: CPT | Performed by: INTERNAL MEDICINE

## 2024-04-04 PROCEDURE — 99214 OFFICE O/P EST MOD 30 MIN: CPT | Performed by: INTERNAL MEDICINE

## 2024-04-04 ASSESSMENT — PAIN SCALES - GENERAL: PAINLEVEL: NO PAIN (0)

## 2024-04-04 NOTE — NURSING NOTE
Chief Complaint   Patient presents with    Follow Up     /60   Pulse 68   Temp 97.5  F (36.4  C) (Oral)   Ht 1.829 m (6')   Wt 66.7 kg (147 lb)   SpO2 99%   BMI 19.94 kg/m    Rowan Franco MA on 4/4/2024 at 3:05 PM

## 2024-04-04 NOTE — PROGRESS NOTES
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  Was previously followed by Dr Jaz Metz (last seen in May 2023).  From Dr Metz's notes, Valentin has had proteinuria around 1.3-1.9 g/d.  He has however not been able to tolerate even small doses of ACEi or ARB due to dizziness.  Consideration/discussion was made about adding an SGLT2i.  Feels generally well.  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  Denies L Ext swelling. Urine is not foamy.  Anxiety and ADHD on therapy.  Followed by therapist.   Happy at school.  Sophomore computer engineering.    Audiology evaluation on 23  indicated normal hearing bilaterally.   Ophthalmology evaluations in  and 2023 were for dermatitis, blepharitis -> a full ophthalmo examination and evaluation for Alport s. Was not performed.(?).   denies any ocular/visual problems.    Mom has hearing loss. (Started in late 's)   Mat GF had Alport -  from GB dis.   Aunt had ESKD in late \"s -> kidney transplant -> failed -> back on dialysis.    No Siblings    JANE participant.  Upcoming study visit.    2024  Started farxiga a month ago.   Tolerating well.  No GI or  spm.  No l. ext swelling  No change in hearing.  Had 2 previous audiology tests neither had abnormalities.  ROS is otherwise unremarkable.     PAST MEDICAL HISTORY:  Past Medical History:   Diagnosis Date    Hematuria     Proteinuria     X-linked Alport syndrome     Mother genetic testing: COL4A5 splice site variant, COL4A5 c.891+1G>A     PAST " SURGICAL HISTORY:  No past surgical history on file.  MEDICATIONS: reviewed on 24  Prescription Medications as of 2024         Rx Number Disp Refills Start End Last Dispensed Date Next Fill Date Owning Pharmacy    amphetamine-dextroamphetamine (ADDERALL XR) 20 MG 24 hr capsule  -- -- 2024 --       Sig: Take 20 mg by mouth daily as needed    Class: Historical    Route: Oral    amphetamine-dextroamphetamine (ADDERALL) 10 MG tablet  -- -- 2024 --       Sig: TAKE 1/2 TO 2 TABLETS BY MOUTH TWICE DAILY (4 HOURS APART) AS NEEDED FOR ADHD. *MAX 2 TABS PER DAY*    Class: Historical    azelastine (ASTELIN) 0.1 % nasal spray  30 mL 3 2023 --   [address]    Sig: Spray 1 spray into both nostrils 2 times daily    Class: E-Prescribe    Route: Both Nostrils    cetirizine (ZYRTEC) 10 MG tablet  10 tablet 1 2023 --   [facility] [address]     Sig: Emergency set: if severe allergic reaction take immediately 100mg Prednisone (2x50mg) and 2 Tabl Cetirizine 10mg and then write precise 12h retrospective diary. If less severe reaction take only the 2 Tabl Cetirizine. Please provide patient with key chain box for these emergency medications    Class: E-Prescribe    clobetasol propionate (CLOBEX) 0.05 % external shampoo  118 mL 3 2023 --   [address]    Six weekly for hair wash    Class: E-Prescribe    dapagliflozin (FARXIGA) 5 MG TABS tablet  30 tablet 11 2024 --   [address]    Sig: Take 1 tablet (5 mg) by mouth daily    Class: E-Prescribe    Route: Oral    EPINEPHrine (ANY BX GENERIC EQUIV) 0.3 MG/0.3ML injection 2-pack  2 each 1 2023 --   [place] [address] [address]     Sig: Inject 0.3 mLs (0.3 mg) into the muscle as needed for anaphylaxis May repeat one time in  5-15 minutes if response to initial dose is inadequate.    Class: E-Prescribe    Route: Intramuscular    ketoconazole (NIZORAL) 2 % external shampoo  120 mL 3 10/2/2023 --   [address]    Sig: Apply topically twice a week 2 times weekly    Class: E-Prescribe    Route: Topical    losartan (COZAAR) 25 MG tablet  45 tablet 3 2/12/2024 --   [address]    Sig: Take 0.5 tablets (12.5 mg) by mouth daily    Class: E-Prescribe    Route: Oral    mometasone (ELOCON) 0.1 % external solution  60 mL 3 10/9/2023 --   [address]    Sig: Apply topically twice a week    Class: E-Prescribe    Route: Topical    predniSONE (DELTASONE) 50 MG tablet  2 tablet 3 7/12/2023 --   [facility] [address]     Sig: Emergency set: if severe allergic reaction take immediately 100mg Prednisone (2x50mg) and 2 Tabl Cetirizine 10mg and then write precise 12h retrospective diary. If less severe reaction take only the 2 Tabl Cetirizine. Please provide patient with key chain box for these emergency medications    Class: E-Prescribe           ALLERGIES:    Allergies   Allergen Reactions    Peanut-Containing Drug Products Anaphylaxis    Nuts Rash     Tree nuts    Shellfish Allergy Rash     REVIEW OF SYSTEMS:  A comprehensive review of systems was performed and found to be negative except as described here or above.  SOCIAL HISTORY:   Social History     Socioeconomic History    Marital status: Single     Spouse name: Not on file    Number of children: Not on file    Years of education: Not on file    Highest education level: Not on file   Occupational History    Not on file   Tobacco Use    Smoking status: Never    Smokeless tobacco: Not on file   Substance and Sexual Activity    Alcohol use: Not on file    Drug use: Not on file    Sexual activity: Not on file    Other Topics Concern    Not on file   Social History Narrative    Valentin is attending the [employer] and studying computer engineering.      Social Determinants of Health     Financial Resource Strain: Not on file   Food Insecurity: Not on file   Transportation Needs: Not on file   Physical Activity: Not on file   Stress: Not on file   Social Connections: Not on file   Interpersonal Safety: Not At Risk (2/20/2023)    Humiliation, Afraid, Rape, and Kick questionnaire     Fear of Current or Ex-Partner: No     Emotionally Abused: No     Physically Abused: No     Sexually Abused: No   Housing Stability: Not on file     FAMILY MEDICAL HISTORY:   Family History   Problem Relation Age of Onset    Alport syndrome Mother     Kidney failure Maternal Grandfather         ESKD in his 40s    Alport syndrome Maternal Grandfather     Hearing Loss Maternal Grandfather     Cerebrovascular Disease Maternal Grandfather         Diet at age 50 due to aortic valve infection and stroke    Hematuria Maternal Aunt     Alport syndrome Maternal Aunt         Kidney transplant at age 39    Alport syndrome Maternal Great-Grandmother         ESKD in her 80s-90s, declined dialysis    Diabetes No family hx of     Glaucoma No family hx of     Macular Degeneration No family hx of      PHYSICAL EXAM:   /60   Pulse 68   Temp 97.5  F (36.4  C) (Oral)   Ht 1.829 m (6')   Wt 66.7 kg (147 lb)   SpO2 99%   BMI 19.94 kg/m      GENERAL APPEARANCE: alert and no distress  EYES: nonicteric  NECK: supple, no adenopathy  RESP: breathing not labored  SKIN: no facial rash  NEURO: mentation intact and speech normal  PSYCH: affect normal/bright   LABS:   Recent Results (from the past 672 hour(s))   Comprehensive metabolic panel    Collection Time: 04/03/24 11:29 AM   Result Value Ref Range    Sodium 142 135 - 145 mmol/L    Potassium 3.8 3.4 - 5.3 mmol/L    Carbon Dioxide (CO2) 30 (H) 22 - 29 mmol/L    Anion Gap 10 7 - 15 mmol/L    Urea Nitrogen 10.7 6.0 -  20.0 mg/dL    Creatinine 0.98 0.67 - 1.17 mg/dL    GFR Estimate >90 >60 mL/min/1.73m2    Calcium 9.5 8.6 - 10.0 mg/dL    Chloride 102 98 - 107 mmol/L    Glucose 93 70 - 99 mg/dL    Alkaline Phosphatase 69 65 - 260 U/L    AST 24 0 - 35 U/L    ALT 10 0 - 50 U/L    Protein Total 6.9 6.4 - 8.3 g/dL    Albumin 4.4 3.5 - 5.2 g/dL    Bilirubin Total 0.7 <=1.2 mg/dL   CBC with platelets and differential    Collection Time: 04/03/24 11:29 AM   Result Value Ref Range    WBC Count 7.2 4.0 - 11.0 10e3/uL    RBC Count 5.31 4.40 - 5.90 10e6/uL    Hemoglobin 16.1 13.3 - 17.7 g/dL    Hematocrit 45.6 40.0 - 53.0 %    MCV 86 78 - 100 fL    MCH 30.3 26.5 - 33.0 pg    MCHC 35.3 31.5 - 36.5 g/dL    RDW 12.4 10.0 - 15.0 %    Platelet Count 178 150 - 450 10e3/uL    % Neutrophils 53 %    % Lymphocytes 32 %    % Monocytes 9 %    % Eosinophils 5 %    % Basophils 1 %    % Immature Granulocytes 0 %    NRBCs per 100 WBC 0 <1 /100    Absolute Neutrophils 3.8 1.6 - 8.3 10e3/uL    Absolute Lymphocytes 2.3 0.8 - 5.3 10e3/uL    Absolute Monocytes 0.7 0.0 - 1.3 10e3/uL    Absolute Eosinophils 0.3 0.0 - 0.7 10e3/uL    Absolute Basophils 0.1 0.0 - 0.2 10e3/uL    Absolute Immature Granulocytes 0.0 <=0.4 10e3/uL    Absolute NRBCs 0.0 10e3/uL   Protein  random urine    Collection Time: 04/03/24 11:32 AM   Result Value Ref Range    Total Protein Urine mg/dL 734.0   mg/dL    Total Protein Urine mg/mg Creat 2.37 (H) 0.00 - 0.20 mg/mg Cr    Creatinine Urine mg/dL 310.0 mg/dL   UA with Microscopic    Collection Time: 04/03/24 11:32 AM   Result Value Ref Range    Color Urine Yellow Colorless, Straw, Light Yellow, Yellow    Appearance Urine Clear Clear    Glucose Urine 1000 (A) Negative mg/dL    Bilirubin Urine Negative Negative    Ketones Urine Negative Negative mg/dL    Specific Gravity Urine 1.030 1.003 - 1.035    Blood Urine Large (A) Negative    pH Urine 6.5 5.0 - 7.0    Protein Albumin Urine 300 (A) Negative mg/dL    Urobilinogen Urine Normal Normal, 2.0  mg/dL    Nitrite Urine Negative Negative    Leukocyte Esterase Urine Negative Negative    Mucus Urine Present (A) None Seen /LPF    RBC Urine >182 (H) <=2 /HPF    WBC Urine 2 <=5 /HPF    Squamous Epithelials Urine <1 <=1 /HPF     CMP  Recent Labs   Lab Test 04/03/24  1129 02/28/24  1119 01/17/24  1600 05/31/23  1441 10/19/22  1657    140 140 141 140   POTASSIUM 3.8 4.5 3.8 4.4 4.1   CHLORIDE 102 101 102 102 102   CO2 30* 30* 30* 29 33*   ANIONGAP 10 9 8 10 5   GLC 93 102* 89 94 90   BUN 10.7 16.0 12.9 9.5 11   CR 0.98 0.93 0.94 0.84 0.85   GFRESTIMATED >90 >90 >90 >90  --    SAMANTHA 9.5 9.5 9.1 9.6 9.4   PHOS  --  4.2  --  3.3 4.7*   PROTTOTAL 6.9 6.6 6.3*  --   --    ALBUMIN 4.4 4.4 4.3 4.3 3.8   BILITOTAL 0.7 1.0 0.6  --   --    ALKPHOS 69 65 63*  --   --    AST 24 25 24  --   --    ALT 10 15 11  --   --      CBC  Recent Labs   Lab Test 04/03/24  1129 01/17/24  1600 05/31/23  1441   HGB 16.1 14.5 15.0   WBC 7.2 6.9  --    RBC 5.31 4.87  --    HCT 45.6 41.1  --    MCV 86 84  --     182  --      INRNo lab results found.  ABGNo lab results found.   URINE STUDIES  Recent Labs   Lab Test 04/03/24  1132 01/17/24  1554 05/31/23  1441 10/19/22  1657   APPEARANCE Clear Clear Clear Clear   URINEGLC 1000* Negative Negative Negative   URINEBILI Negative Negative Negative Negative   URINEKETONE Negative Negative Negative Negative   SG 1.030 1.008 1.016 1.015   UBLD Large* Large* Large* Large*   URINEPH 6.5 7.0 7.5* 7.5*   PROTEIN 300* 100* 200* 100*   NITRITE Negative Negative Negative Negative   LEUKEST Negative Negative Negative Negative   RBCU >182* 68* 74* 87*   WBCU 2 <1 1 4     Recent Labs   Lab Test 10/19/22  1657   UTPG 1.30*      Latest Reference Range & Units 05/31/23 14:41 01/17/24 15:54 04/03/24 11:32   Total Protein Urine mg/mg Creat 0.00 - 0.20 mg/mg Cr 1.91 (H) 2.19 (H) 2.37 (H)   (H): Data is abnormally high    ASSESSMENT AND PLAN:   Mr Gaytan is seen for X linked Alport syndrome.   He is doing well  and reports no acute complaints.  He is tolerating the SGLT2i well without AE.  His BP is on the low side, and I do not think we can titrate the dose of losartan up.   His proteinuria remains stable, but has not improved.    I will check with Dr Metz whether there is a clinical trial to which he would qualify.    I otherwise plan to see him again in 6 months.  Prasanna Aguilar MD  Division of Renal Disease and Hypertension  April 4, 2024

## 2024-04-04 NOTE — LETTER
"2024       RE: Valentin Gaytan  [address] [facility] [address]     Dear Colleague,    Thank you for referring your patient, Valentin Gaytan, to the [facility] [place] at [facility]. Please see a copy of my visit note below.    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  Was previously followed by Dr Jaz Metz (last seen in May 2023).  From Dr Metz's notes, Valentin has had proteinuria around 1.3-1.9 g/d.  He has however not been able to tolerate even small doses of ACEi or ARB due to dizziness.  Consideration/discussion was made about adding an SGLT2i.  Feels generally well.  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  Denies L Ext swelling. Urine is not foamy.  Anxiety and ADHD on therapy.  Followed by therapist.   Happy at school.  Sophomore computer engineering.    Audiology evaluation on 23  indicated normal hearing bilaterally.   Ophthalmology evaluations in  and 2023 were for dermatitis, blepharitis -> a full ophthalmo examination and evaluation for Alport s. Was not performed.(?).   denies any ocular/visual problems.    Mom has hearing loss. (Started in late 's)   Mat GF had Alport -  from GB dis.   Aunt had ESKD in late \"s -> kidney transplant -> failed -> back on dialysis.    No Siblings    JANE participant.  Upcoming study visit.    2024  Started farxiga a month ago.   Tolerating well.  No GI or  spm.  No l. ext swelling  No change in " hearing.  Had 2 previous audiology tests neither had abnormalities.  ROS is otherwise unremarkable.     PAST MEDICAL HISTORY:  Past Medical History:   Diagnosis Date    Hematuria     Proteinuria     X-linked Alport syndrome     Mother genetic testing: COL4A5 splice site variant, COL4A5 c.891+1G>A     PAST SURGICAL HISTORY:  No past surgical history on file.  MEDICATIONS: reviewed on 24  Prescription Medications as of 2024         Rx Number Disp Refills Start End Last Dispensed Date Next Fill Date Owning Pharmacy    amphetamine-dextroamphetamine (ADDERALL XR) 20 MG 24 hr capsule  -- -- 2024 --       Sig: Take 20 mg by mouth daily as needed    Class: Historical    Route: Oral    amphetamine-dextroamphetamine (ADDERALL) 10 MG tablet  -- -- 2024 --       Sig: TAKE 1/2 TO 2 TABLETS BY MOUTH TWICE DAILY (4 HOURS APART) AS NEEDED FOR ADHD. *MAX 2 TABS PER DAY*    Class: Historical    azelastine (ASTELIN) 0.1 % nasal spray  30 mL 3 2023 --   [address]    Sig: Spray 1 spray into both nostrils 2 times daily    Class: E-Prescribe    Route: Both Nostrils    cetirizine (ZYRTEC) 10 MG tablet  10 tablet 1 2023 --   [place], [place] - [place], [place] - [address]     Sig: Emergency set: if severe allergic reaction take immediately 100mg Prednisone (2x50mg) and 2 Tabl Cetirizine 10mg and then write precise 12h retrospective diary. If less severe reaction take only the 2 Tabl Cetirizine. Please provide patient with key chain box for these emergency medications    Class: E-Prescribe    clobetasol propionate (CLOBEX) 0.05 % external shampoo  118 mL 3 2023 --   [address]    Six weekly for hair wash    Class: E-Prescribe    dapagliflozin (FARXIGA) 5 MG TABS tablet  30 tablet 11 2024 --   [address]    Sig: Take 1 tablet (5  mg) by mouth daily    Class: E-Prescribe    Route: Oral    EPINEPHrine (ANY BX GENERIC EQUIV) 0.3 MG/0.3ML injection 2-pack  2 each 1 7/12/2023 --   [place] [address] [address]     Sig: Inject 0.3 mLs (0.3 mg) into the muscle as needed for anaphylaxis May repeat one time in 5-15 minutes if response to initial dose is inadequate.    Class: E-Prescribe    Route: Intramuscular    ketoconazole (NIZORAL) 2 % external shampoo  120 mL 3 10/2/2023 --   [address]    Sig: Apply topically twice a week 2 times weekly    Class: E-Prescribe    Route: Topical    losartan (COZAAR) 25 MG tablet  45 tablet 3 2/12/2024 --   [address]    Sig: Take 0.5 tablets (12.5 mg) by mouth daily    Class: E-Prescribe    Route: Oral    mometasone (ELOCON) 0.1 % external solution  60 mL 3 10/9/2023 --   [address]    Sig: Apply topically twice a week    Class: E-Prescribe    Route: Topical    predniSONE (DELTASONE) 50 MG tablet  2 tablet 3 7/12/2023 --   [facility] [address]     Sig: Emergency set: if severe allergic reaction take immediately 100mg Prednisone (2x50mg) and 2 Tabl Cetirizine 10mg and then write precise 12h retrospective diary. If less severe reaction take only the 2 Tabl Cetirizine. Please provide patient with key chain box for these emergency medications    Class: E-Prescribe           ALLERGIES:    Allergies   Allergen Reactions    Peanut-Containing Drug Products Anaphylaxis    Nuts Rash     Tree nuts    Shellfish Allergy Rash     REVIEW OF SYSTEMS:  A comprehensive review of systems was performed and found to be negative except as described here or above.  SOCIAL HISTORY:   Social History     Socioeconomic History    Marital status: Single     Spouse name: Not on file    Number of children: Not on  file    Years of education: Not on file    Highest education level: Not on file   Occupational History    Not on file   Tobacco Use    Smoking status: Never    Smokeless tobacco: Not on file   Substance and Sexual Activity    Alcohol use: Not on file    Drug use: Not on file    Sexual activity: Not on file   Other Topics Concern    Not on file   Social History Narrative    Valentin is attending the [employer] and studying computer engineering.      Social Determinants of Health     Financial Resource Strain: Not on file   Food Insecurity: Not on file   Transportation Needs: Not on file   Physical Activity: Not on file   Stress: Not on file   Social Connections: Not on file   Interpersonal Safety: Not At Risk (2/20/2023)    Humiliation, Afraid, Rape, and Kick questionnaire     Fear of Current or Ex-Partner: No     Emotionally Abused: No     Physically Abused: No     Sexually Abused: No   Housing Stability: Not on file     FAMILY MEDICAL HISTORY:   Family History   Problem Relation Age of Onset    Alport syndrome Mother     Kidney failure Maternal Grandfather         ESKD in his 40s    Alport syndrome Maternal Grandfather     Hearing Loss Maternal Grandfather     Cerebrovascular Disease Maternal Grandfather         Diet at age 50 due to aortic valve infection and stroke    Hematuria Maternal Aunt     Alport syndrome Maternal Aunt         Kidney transplant at age 39    Alport syndrome Maternal Great-Grandmother         ESKD in her 80s-90s, declined dialysis    Diabetes No family hx of     Glaucoma No family hx of     Macular Degeneration No family hx of      PHYSICAL EXAM:   /60   Pulse 68   Temp 97.5  F (36.4  C) (Oral)   Ht 1.829 m (6')   Wt 66.7 kg (147 lb)   SpO2 99%   BMI 19.94 kg/m      GENERAL APPEARANCE: alert and no distress  EYES: nonicteric  NECK: supple, no adenopathy  RESP: breathing not labored  SKIN: no facial rash  NEURO: mentation intact and speech normal  PSYCH: affect normal/bright   LABS:    Recent Results (from the past 672 hour(s))   Comprehensive metabolic panel    Collection Time: 04/03/24 11:29 AM   Result Value Ref Range    Sodium 142 135 - 145 mmol/L    Potassium 3.8 3.4 - 5.3 mmol/L    Carbon Dioxide (CO2) 30 (H) 22 - 29 mmol/L    Anion Gap 10 7 - 15 mmol/L    Urea Nitrogen 10.7 6.0 - 20.0 mg/dL    Creatinine 0.98 0.67 - 1.17 mg/dL    GFR Estimate >90 >60 mL/min/1.73m2    Calcium 9.5 8.6 - 10.0 mg/dL    Chloride 102 98 - 107 mmol/L    Glucose 93 70 - 99 mg/dL    Alkaline Phosphatase 69 65 - 260 U/L    AST 24 0 - 35 U/L    ALT 10 0 - 50 U/L    Protein Total 6.9 6.4 - 8.3 g/dL    Albumin 4.4 3.5 - 5.2 g/dL    Bilirubin Total 0.7 <=1.2 mg/dL   CBC with platelets and differential    Collection Time: 04/03/24 11:29 AM   Result Value Ref Range    WBC Count 7.2 4.0 - 11.0 10e3/uL    RBC Count 5.31 4.40 - 5.90 10e6/uL    Hemoglobin 16.1 13.3 - 17.7 g/dL    Hematocrit 45.6 40.0 - 53.0 %    MCV 86 78 - 100 fL    MCH 30.3 26.5 - 33.0 pg    MCHC 35.3 31.5 - 36.5 g/dL    RDW 12.4 10.0 - 15.0 %    Platelet Count 178 150 - 450 10e3/uL    % Neutrophils 53 %    % Lymphocytes 32 %    % Monocytes 9 %    % Eosinophils 5 %    % Basophils 1 %    % Immature Granulocytes 0 %    NRBCs per 100 WBC 0 <1 /100    Absolute Neutrophils 3.8 1.6 - 8.3 10e3/uL    Absolute Lymphocytes 2.3 0.8 - 5.3 10e3/uL    Absolute Monocytes 0.7 0.0 - 1.3 10e3/uL    Absolute Eosinophils 0.3 0.0 - 0.7 10e3/uL    Absolute Basophils 0.1 0.0 - 0.2 10e3/uL    Absolute Immature Granulocytes 0.0 <=0.4 10e3/uL    Absolute NRBCs 0.0 10e3/uL   Protein  random urine    Collection Time: 04/03/24 11:32 AM   Result Value Ref Range    Total Protein Urine mg/dL 734.0   mg/dL    Total Protein Urine mg/mg Creat 2.37 (H) 0.00 - 0.20 mg/mg Cr    Creatinine Urine mg/dL 310.0 mg/dL   UA with Microscopic    Collection Time: 04/03/24 11:32 AM   Result Value Ref Range    Color Urine Yellow Colorless, Straw, Light Yellow, Yellow    Appearance Urine Clear Clear     Glucose Urine 1000 (A) Negative mg/dL    Bilirubin Urine Negative Negative    Ketones Urine Negative Negative mg/dL    Specific Gravity Urine 1.030 1.003 - 1.035    Blood Urine Large (A) Negative    pH Urine 6.5 5.0 - 7.0    Protein Albumin Urine 300 (A) Negative mg/dL    Urobilinogen Urine Normal Normal, 2.0 mg/dL    Nitrite Urine Negative Negative    Leukocyte Esterase Urine Negative Negative    Mucus Urine Present (A) None Seen /LPF    RBC Urine >182 (H) <=2 /HPF    WBC Urine 2 <=5 /HPF    Squamous Epithelials Urine <1 <=1 /HPF     CMP  Recent Labs   Lab Test 04/03/24  1129 02/28/24  1119 01/17/24  1600 05/31/23  1441 10/19/22  1657    140 140 141 140   POTASSIUM 3.8 4.5 3.8 4.4 4.1   CHLORIDE 102 101 102 102 102   CO2 30* 30* 30* 29 33*   ANIONGAP 10 9 8 10 5   GLC 93 102* 89 94 90   BUN 10.7 16.0 12.9 9.5 11   CR 0.98 0.93 0.94 0.84 0.85   GFRESTIMATED >90 >90 >90 >90  --    SAMANTHA 9.5 9.5 9.1 9.6 9.4   PHOS  --  4.2  --  3.3 4.7*   PROTTOTAL 6.9 6.6 6.3*  --   --    ALBUMIN 4.4 4.4 4.3 4.3 3.8   BILITOTAL 0.7 1.0 0.6  --   --    ALKPHOS 69 65 63*  --   --    AST 24 25 24  --   --    ALT 10 15 11  --   --      CBC  Recent Labs   Lab Test 04/03/24  1129 01/17/24  1600 05/31/23  1441   HGB 16.1 14.5 15.0   WBC 7.2 6.9  --    RBC 5.31 4.87  --    HCT 45.6 41.1  --    MCV 86 84  --     182  --      INRNo lab results found.  ABGNo lab results found.   URINE STUDIES  Recent Labs   Lab Test 04/03/24  1132 01/17/24  1554 05/31/23  1441 10/19/22  1657   APPEARANCE Clear Clear Clear Clear   URINEGLC 1000* Negative Negative Negative   URINEBILI Negative Negative Negative Negative   URINEKETONE Negative Negative Negative Negative   SG 1.030 1.008 1.016 1.015   UBLD Large* Large* Large* Large*   URINEPH 6.5 7.0 7.5* 7.5*   PROTEIN 300* 100* 200* 100*   NITRITE Negative Negative Negative Negative   LEUKEST Negative Negative Negative Negative   RBCU >182* 68* 74* 87*   WBCU 2 <1 1 4     Recent Labs   Lab Test  10/19/22  1657   UTPG 1.30*      Latest Reference Range & Units 05/31/23 14:41 01/17/24 15:54 04/03/24 11:32   Total Protein Urine mg/mg Creat 0.00 - 0.20 mg/mg Cr 1.91 (H) 2.19 (H) 2.37 (H)   (H): Data is abnormally high    ASSESSMENT AND PLAN:   Mr Gaytan is seen for X linked Alport syndrome.   He is doing well and reports no acute complaints.  He is tolerating the SGLT2i well without AE.  His BP is on the low side, and I do not think we can titrate the dose of losartan up.   His proteinuria remains stable, but has not improved.    I will check with Dr Metz whether there is a clinical trial to which he would qualify.    I otherwise plan to see him again in 6 months.  Prasanna Aguilar MD  Division of Renal Disease and Hypertension  April 4, 2024

## 2024-05-28 DIAGNOSIS — Z91.018 FOOD ALLERGY: Primary | ICD-10-CM

## 2024-05-28 DIAGNOSIS — T78.3XXD ANGIOEDEMA, SUBSEQUENT ENCOUNTER: ICD-10-CM

## 2024-05-28 RX ORDER — PREDNISONE 50 MG/1
TABLET ORAL
Qty: 2 TABLET | Refills: 4 | Status: SHIPPED | OUTPATIENT
Start: 2024-05-28 | End: 2024-05-31

## 2024-05-28 RX ORDER — CETIRIZINE HYDROCHLORIDE 10 MG/1
TABLET ORAL
Qty: 30 TABLET | Refills: 1 | Status: SHIPPED | OUTPATIENT
Start: 2024-05-28

## 2024-05-31 ENCOUNTER — OFFICE VISIT (OUTPATIENT)
Dept: ALLERGY | Facility: CLINIC | Age: 19
End: 2024-05-31
Payer: COMMERCIAL

## 2024-05-31 DIAGNOSIS — T78.3XXD ANGIOEDEMA, SUBSEQUENT ENCOUNTER: ICD-10-CM

## 2024-05-31 DIAGNOSIS — Z91.010 PEANUT ALLERGY: ICD-10-CM

## 2024-05-31 DIAGNOSIS — Z91.018 TREE NUT ALLERGY: ICD-10-CM

## 2024-05-31 DIAGNOSIS — Z91.013 SHRIMP ALLERGY: ICD-10-CM

## 2024-05-31 DIAGNOSIS — L20.89 OTHER ATOPIC DERMATITIS: Primary | ICD-10-CM

## 2024-05-31 DIAGNOSIS — Z91.018 FOOD ALLERGY: ICD-10-CM

## 2024-05-31 PROCEDURE — 99213 OFFICE O/P EST LOW 20 MIN: CPT | Performed by: DERMATOLOGY

## 2024-05-31 RX ORDER — PREDNISONE 50 MG/1
TABLET ORAL
Qty: 2 TABLET | Refills: 4 | Status: SHIPPED | OUTPATIENT
Start: 2024-05-31

## 2024-05-31 NOTE — NURSING NOTE
Chief Complaint   Patient presents with    Allergy Recheck     Recent emergency set use discussion, completed 12-hr retrospective diary      Chemo Arce RN

## 2024-05-31 NOTE — LETTER
5/31/2024         RE: Valentin Gaytan  [address] [facility] [address]        Dear Colleague,    Thank you for referring your patient, Valentin Gaytan, to the [facility] [place]. Please see a copy of my visit note below.    [facility] Dermato-allergology Note  Office visit  Encounter Date: May 31, 2024  ____________________________________________    CC: Allergy Recheck (Recent emergency set use discussion, completed 12-hr retrospective diary )      HPI:  (May 31, 2024)  Mr. Valentin Gaytan is a(n) 18 year old male who presents today as a return patient for allergy consultation  - Follow-up in Derm-Allergy clinic if necessary  - On 5/26/24, ate pure vanilla soft serve ice cream in a bowl at a restaurant; believes spoon used to serve his ice cream was used to serve other types of ice cream  - Lip swelling started within a few minutes  - Throat became itchy and felt a little bit tighter  - As soon as throat started to hurt, took 2 prednisone and 2 Zyrtec  - Progressed slightly for 4-5 minutes where throat was sore, but he could still breath perfectly fine  - 20-25 minutes of being uncomfortable but did not worsen  - Improved and then resolved over the next 2-3 hours  - Did not use EpiPen  - Unsure of exactly what spoon came into contact with for cross-contamination to have occurred  - Feels peanut is bigger allergy than tree nut  - No issues with soy, green peas, lima beans, or other beans  - Mostly avoids lima beans because he does not like the texture  - Skin is doing good and he has not had any issues  - Using shampoo 2x/week  - Applying lotion after every shower  - Otherwise feeling well in usual state of health    Physical Exam:  General: In no acute distress, well-developed, well-nourished  Eyes: no conjunctivitis  ENT: no signs of rhinitis   Pulmonary: no wheezing or coughing  Skin: no active eczematous skin lesions on visible skin    Earlier History and Allergy  exams:  (Sep 29, 2023)  - Follow-up in Derm-Allergy clinic for 2nd readings and final conclusions after 4 days.    (Sep 27, 2023)  - Follow-up in Derm-Allergy clinic for 1st readings of patch tests after 2 days. 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 Stopped that about 3 weeks ago.  - got in Feb 2023 from Dr. Sudhakar Sureshpic and this improved the situation  - has also some scalp dermatitis with reactions to some shampoos    dandruffs in scalp with slightly erythematous scalp skin and periorbital hyperpigmentations    Past Medical History:   Patient Active Problem List   Diagnosis     Eczema, unspecified type     X-linked Alport syndrome     Past Medical History:   Diagnosis Date     Hematuria      Proteinuria      X-linked Alport syndrome     Mother genetic testing: COL4A5 splice site variant, COL4A5 c.[id number]     Allergies:  Allergies   Allergen Reactions     Peanut-Containing Drug Products Anaphylaxis     Nuts Rash     Tree nuts     Shellfish Allergy Rash     Medications:  Current Outpatient Medications   Medication Sig Dispense Refill     predniSONE (DELTASONE) 50 MG tablet Emergency set: if severe allergic reaction take immediately 100mg Prednisone (2x50mg) and 2 Tabl Cetirizine 10mg and then write precise 12h retrospective diary. If less severe reaction take only the 2  Tabl Cetirizine. Please provide patient with key chain box for these emergency medications 2 tablet 4     amphetamine-dextroamphetamine (ADDERALL XR) 20 MG 24 hr capsule Take 20 mg by mouth daily as needed       amphetamine-dextroamphetamine (ADDERALL) 10 MG tablet TAKE 1/2 TO 2 TABLETS BY MOUTH TWICE DAILY (4 HOURS APART) AS NEEDED FOR ADHD. *MAX 2 TABS PER DAY*       azelastine (ASTELIN) 0.1 % nasal spray Spray 1 spray into both nostrils 2 times daily 30 mL 3     cetirizine (ZYRTEC) 10 MG tablet Emergency set: if severe allergic reaction take immediately 100mg Prednisone (2x50mg) and 2 Tabl Cetirizine 10mg and then write precise 12h retrospective diary. If less severe reaction take only the 2 Tabl Cetirizine. Please provide patient with key chain box for these emergency medications 30 tablet 1     cetirizine (ZYRTEC) 10 MG tablet Emergency set: if severe allergic reaction take immediately 100mg Prednisone (2x50mg) and 2 Tabl Cetirizine 10mg and then write precise 12h retrospective diary. If less severe reaction take only the 2 Tabl Cetirizine. Please provide patient with key chain box for these emergency medications 10 tablet 1     clobetasol propionate (CLOBEX) 0.05 % external shampoo 2x weekly for hair wash 118 mL 3     dapagliflozin (FARXIGA) 5 MG TABS tablet Take 1 tablet (5 mg) by mouth daily 30 tablet 11     EPINEPHrine (ANY BX GENERIC EQUIV) 0.3 MG/0.3ML injection 2-pack Inject 0.3 mLs (0.3 mg) into the muscle as needed for anaphylaxis May repeat one time in 5-15 minutes if response to initial dose is inadequate. 2 each 1     ketoconazole (NIZORAL) 2 % external shampoo Apply topically twice a week 2 times weekly 120 mL 3     losartan (COZAAR) 25 MG tablet Take 0.5 tablets (12.5 mg) by mouth daily 45 tablet 3     mometasone (ELOCON) 0.1 % external solution Apply topically twice a week 60 mL 3     predniSONE (DELTASONE) 50 MG tablet Emergency set: if severe allergic reaction take immediately 100mg Prednisone  (2x50mg) and 2 Tabl Cetirizine 10mg and then write precise 12h retrospective diary. If less severe reaction take only the 2 Tabl Cetirizine. Please provide patient with key chain box for these emergency medications 2 tablet 3     No current facility-administered medications for this visit.     Social History:  The patient is a student. Patient has the following hobbies or non-occupational exposure: N/A.     Family History:  Family History   Problem Relation Age of Onset     Alport syndrome Mother      Kidney failure Maternal Grandfather         ESKD in his 40s     Alport syndrome Maternal Grandfather      Hearing Loss Maternal Grandfather      Cerebrovascular Disease Maternal Grandfather         Diet at age 50 due to aortic valve infection and stroke     Hematuria Maternal Aunt      Alport syndrome Maternal Aunt         Kidney transplant at age 39     Alport syndrome Maternal Great-Grandmother         ESKD in her 80s-90s, declined dialysis     Diabetes No family hx of      Glaucoma No family hx of      Macular Degeneration No family hx of    Father has strong eczema and got IT in [place]    Previous Labs, Allergy Tests, Dermatopathology, Imaging:  Feb 2023 with Dr. JACK De La Fuente  # Eyelid dermatitis - right upper lid  Atopic predisposition w/ seasonal allergy. Right handed. Suspected ACD. Will start protopic  - Tacrolimus ointment BID - discussed black box warning, application strategies and side effects (burning)  - Dr. Ho scheduled 07/2023 for consideration of patch    Referred By: Migdalia Stringer MD  [address] [place] [address]     Allergy Tests:  Past Allergy Test    Order for Future Allergy Testing:    [x] Outpatient  [] Inpatient: [facility]..../ Bed ....       Skin Atopy (atopic dermatitis) [x] Yes   [] No ...dry skin.  Contact allergies:   [x] Yes   [] No ...band aid some rash for 24h  Hand eczema:   [] Yes   [x] No           Leading hand:   [] R   [] L       [] Ambidextrous         Drug allergies:         [] Yes   [x] No  which?......    Urticaria/Angioedema  [] Yes   [] No .........  Food Allergy:  [x] Yes   [] No  Which?.peanuts = last time accidentally in March with contaminated ice cream and slight throat swelling --> lip/throat swelling and hives --> proven by skin tests about 10 years ago  Tree nuts: almonds > hazelnuts > pistachious = mouth swelling and hives, but no breathing problems  Shellfish = mouth swelling, fish OK, no problems with fruits  Pets :  [] Yes   [x] No  Which?.RC to dogs and cats         [x]  Rhinitis   [x] Conjunctivitis   [] Sinusitis   [] Polyposis   [] Otitis   [] Pharyngitis         []  Postnasal drip    []  none  Operations:   [] Tonsils   [] Septum   [] Sinus   [] Polyposis        [] Asthma bronchiale   [] Coughing      [x]  none  Symptoms (mostly Rhinoconjunctivitis and Asthma) aggravated by:  Season   [] I   [] II   [] III   [] IV   [x]V   [x]VI   []VII   [x]VIII   [x]IX   []X   []XI   []XII     [] perennial   Day time      [] morning   [] noon      [] evening        [] night    [x] whole day........  []  none  Location/changes    [] inside        [x] outside   [] mountains    [] sea     [] others.............   []  none  Triggers, specific     [x] animals     [x] plants     [] dust              [] others ...........................    []  none  Triggers, others       [] work          [] psyche    [] sport            [] others .............................  [x]  none  Irritant                [] phys efforts [] smoke    [] heat/cold     [] odors  []others............... [x]  none    Order for PATCH TESTS  Reason for tests (suspected allergy): in September/October  Known previous allergies: none  Standardized panels  [x] Standard panel (40 tests)  [x] Preservatives & Antimicrobials (31 tests)  [x] Emulsifiers & Additives (25 tests)   [x] Perfumes/Flavours & Plants (25 tests)  [] Hairdresser panel (12 tests)  [] Rubber Chemicals (22 tests)  [x] Plastics (26 tests)  []  Colorants/Dyes/Food additives (20 tests)  [x] Metals (implants/dental) (24 tests)  [] Local anaesthetics/NSAIDs (13 tests)  [] Antibiotics & Antimycotics (14 tests)   [] Corticosteroids (15 tests)   [] Photopatch test (62 tests)   [] others: ...      [] Patient's own products: ...  DO NOT test if chemical or biological identity is unknown!   always ask from patient the product information and safety sheets (MSDS)       Order for PRICK TESTS    Reason for tests (suspected allergy): atopy with food allergy  Known previous allergies: none    Standardized prick panels  [x] Atopic panel (20 tests)  [] Pediatric Panel (12 tests)  [] Milk, Meat, Eggs, Grains (20 tests)   [] Dust, Epithelia, Feathers (10 tests)  [x] Fish, Seafood, Shellfish (17 tests)  [] Nuts, Beans (14 tests)  [] Spice, Vegetable, Fruit (17 tests)  [] Pollen Panel = Tree, Grass, Weed (24 tests)  [x] Others: peanuts, hazenuts, almonds.      [] Patient's own products: ...  DO NOT test if chemical or biological identity is unknown!   always ask from patient the product information and safety sheets (MSDS)     Standardized intradermal tests  [] Penicillium notatum [] Aspergillus fumigatus [] House dust mites D.far & D. pteron  [] Cat    [] dog  [] Others: ...  [] Bee venom   [] Wasp venom  !!Specific protocol with dilutions!!       Order for Drug allergy tests (prick & Intradermal & patch tests)    [] Penicillin G  [] Ampicillin [] Cefazolin   [] Ceftriaxone   [] Ceftazidime  [] Bactrim    [] Others: ...  Order for ... as test date      Atopy Screen (Placed Jul 12, 2023)  No Substance Readings (15 min) Evaluation   POS Histamine 1mg/ml ++    NEG NaCl 0.9% -      No Substance Readings (15 min) Evaluation   1 Alternaria alternata (tenuis)  +    2 Cladosporium herbarum +    3 Aspergillus fumigatus -    4 Penicillium notatum -    5 Dermatophagoides pteronyssinus ++++    6 Dermatophagoides farinae ++++    7 Dog epithelium (canis spp) -    8 Cat hair (shine catus)  ++    9 Cockroach   (Blatella americana & germanica) -    10 Grass mix midwest   (Yazmin, Orchard, Redtop, Wesley) ++    11 Sunday grass (sorghum halepense) ++    12 Weed mix   (common Cocklebur, Lamb s quarters, rough redroot Pigweed, Dock/Sorrel) ++    13 Mug wort (artemisia vulgare) ++    14 Ragweed giant/short (ambrosia spp) ++    15      16 Tree mix 1 (Pecan, Maple BHR, Oak RVW, american Flovilla, black Aptos) +/++    17 Red cedar (juniperus virginia) +    18 Tree mix 2   (white Gregory, river/red Birch, black Troy, common DeKalb, american Elm) ++    19 Box elder/Maple mix (acer spp) +    20 Tuscola shagbark (carya ovata) ++    Conclusion:    Fish and Seafood (Placed Jul 12, 2023)  No Substance Readings (15min) Evaluation   POS Histamine 1mg/ml ++    NEG NaCl 0.9% -      No Substance Readings (15min) Evaluation   1 peanuts ++++    2 hazelnut +    3 almond +/++    4 pistachios +    11 Crab (callinectes spp) -    12 Lobster (homarus americanus) -    13 Shrimp white/brown/pink (farfantepenaeus&litopenaeus) -    14 Kingcrab (paralithodes camtschatica) -    15 Scallop (placopecten magellanicus) -    16 Clam (mercenaria mercenaria) -    17 Oyster (cstrea/crassostrea) -    Conclusion:    Nuts and Beans (Placed Sep 27, 2023)  No Substance Readings (15min) Evaluation   POS Histamine 1mg/ml ++    NEG NaCl 0.9% -      No Substance Readings (15min) Evaluation   1 Wildwood (prunus dulcis) ++    2 Brazil nut (bertholletia excels) -    3 Cashew nut (anacardium occidentale) -    4 Coconut (cocos nucifera) -    5 Hazelnut (corylus americana) -    6 Pecan (carya illinoinensis) -    7 Troy (juglans regia) -    8 Pistachio nut (pistacia vera) -    9 peanuts +++    10 Akers Locke (phaseolus lunatus) ++    11 String Locke (phaseolus vulgaris) -    12 Kidney bean (phaseolus vulgaris) -    13 Green Pea (pisum sativum) +/++    14 Soybean (glycine max) (+)    15 Fresh shrimp prick/prick ++    Conclusion: mostly reactions to beans and  peanut, less to nuts (except almond)    RESULTS & EVALUATION of PATCH TESTS    Sep 25, 2023 application of patch tests:    Patch test readings after     [x] 2 days, [] 3 days [x] 4 days, [] 5 days,  Other duration: ...    STANDARD Series                                          # Substance 2 days 4 days remarks     1 Garret Mix [C] - -       2 Colophony - -       3  2-Mercaptobenzothiazole  - -       4 Methylisothiazolinone - -       5 Carba Mix - -       6 Thiuram Mix [A] - -       7 Bisphenol A Epoxy Resin - -       8 Y-Ohnu-Icmpylagzoa-Formaldehyde Resin - -       9 Mercapto Mix [A] - -       10 Black Rubber Mix- PPD [B] - -       11 Potassium Dichromate  -  -       12 Balsam of Peru (Myroxylon Pereirae Resin) - -       13 Nickel Sulphate Hexahydrate - +       14 Mixed Dialkyl Thiourea - -       15 Paraben Mix [B] - -       16 Methyldibromo Glutaronitrile - -       17 Fragrance Mix 8% - -       18 2-Bromo-2-Nitropropane-1,3-Diol (Bronopol) CT - -       19 Lyral - -       20 Tixocortol-21- Pivalate CT - -       21 Diazolidinyl urea (Germall II) - -        22 Methyl Methacrylate - -       23 Cobalt (II) Chloride Hexahydrate (+) -       24 Fragrance Mix II  - -       25 Compositae Mix - -       26 Benzoyl Peroxide - -       27 Bacitracin - -       28 Formaldehyde - -       29 Methylchloroisothiazolinone / Methylisothiazolinone - -       30 Corticosteroid Mix CT - -       31 Sodium Lauryl Sulfate - -       32 Lanolin Alcohol - -       33 Turpentine - -       34 Cetylstearylalcohol - -       35 Chlorhexidine Dicluconate - -       36 Budesonide - -       37 Imidazolidinyl Urea  - -       38 Ethyl-2 Cyanoacrylate NA NA       39 Quaternium 15 (Dowicil 200) - -       40 Decyl Glucoside - -     PRESERVATIVES & ANTIMICROBIALS        # Substance 2 days 4 days remarks   41 1  1,2-Benzisothiazoline-3-One, Sodium Salt - -     2  1,3,5-Pablo (2-Hydroxyethyl) - Hexahydrotriazine (Grotan BK) - -     3 8-Crzobxhclhxuf-7-Nitro-1,  3-Propanediol - -     4  3, 4, 4' - Triclocarban - -    45 5 4 - Chloro - 3 - Cresol - -     6 4 - Chloro - 4 - Xylenol (PCMX) - -     7 7-Ethylbicyclooxazolidine (Bioban [id number]) - -     8 Benzalkonium Chloride CT - -     9 Benzyl Alcohol - -    50 10 Cetalkonium Chloride - -     11 Cetylpyrimidine Chloride  - -     12 Chloroacetamide - -     13 DMDM Hydantoin - -     14 Glutaraldehyde - -    55 15 Triclosan - -     16 Glyoxal Trimeric Dihydrate - -     17 Iodopropynyl Butylcarbamate - -     18 Octylisothiazoline - -     19 Bithionol CT NA NA    60 20 Bioban P 1487 (Nitrobutyl) Morpholine/(Ethylnitro-Trimethylene) Dimorpholine - -     21 Phenoxyethanol - -     22 Phenyl Salicylate - -     23 Povidone Iodine - -     24 Sodium Benzoate - -    65 25 Sodium Disulfite - -     26 Sorbic Acid - -     27 Thimerosal - -     28 Melamine Formaldehyde Resin - -     29 Ethylenediamine Dihydrochloride - -      Parabens      70 30 Butyl-P-Hydroxybenzoate - -     31 Ethyl-P-Hydroxybenzoate - -     32 Methyl-P-Hydroxybenzoate - -    73 33 Propyl-P-Hydroxybenzoate - -    EMULSIFIERS & ADDITIVES       # Substance 2 days 4 days remarks   74 1 Polyethylene Glycol-400 - -    75 2 Cocamidopropyl Betaine - -     3 Amerchol L101 - -     4 Propylene Glycol - -     5 Triethanolamine - -     6 Sorbitane Sesquiolate CT - -    80 7 Isopropylmyristate - -     8 Polysorbate 80 CT - -     9 Amidoamine   (Stearamidopropyl Dimethylamine) - -     10 Oleamidopropyl Dimethylamine - -     11 Lauryl Glucoside - -    85 12 Coconut Diethanolamide  - -     13 2-Hydroxy-4-Methoxy Benzophenone (Oxybenzone) - -     14 Benzophenone-4 (Sulisobenzon) - -     15 Propolis - -     16 Dexpanthenol - -    90 17 Abitol - -     18 Tert-Butylhydroquinone - -     19 Benzyl Salicylate - -     20 Dimethylaminopropylamin (DMPA) - -     21 Zinc Pyrithione (Zinc Omadine)  - -    95 22 Pablo(Hydroxymethyl) Nitromethane  - -      Antioxidant       23 Dodecyl Gallate - -     24  Butylhydroxyanisole (BHA) - -     25 Butylhydroxytoluene (BHT) - -     26 Di-Alpha-Tocopherol (Vit E) - -    100 27 Propyl Gallate - -    PERFUMES, FLAVORS & PLANTS        # Substance 2 days 4 days remarks   101 1 Benzyl Cinnamate - -     2 Di-Limonene (Dipentene) - -     3 Cananga Odorata (Jonelle Sal) (I) - -     4 Lichen Acid Mix - -    105 5 Mentha Piperita Oil (Peppermint Oil) - -     6 Sesquiterpenelactone mix - -     7 Tea Tree Oil, Oxidized - -     8 Wood Tar Mix (+) -     9 Abietic Acid - -    110 10 Lavendula Angustifolia Oil (Lavender Oil) - -     11 Fragrance mix II CT * 14% - -      Fragrance Mix I       12 Oakmoss Absolute - -     13 Eugenol - -     14 Geraniol - -    115 15 Hydroxycitronellal - -     16 Isoeugenol - -     17 Cinnamic Aldehyde - -     18 Cinnamic Alcohol  - -      Fragrance mix II       19 Citronellol - -    120 20 Alpha-Hexylcinnamic Aldehyde    - -     21 Citral - -     22 Farnesol - -    123 23 Coumarin - -    Hexylcinnamic aldehyde, Coumarin, Farnesol, Hydroxyisohexy3-cyclohexene carboxaldehyde, citral, citrolellol  PLASTICS (Acrylates/Epoxy Systems)       # Substance 2 days 4 days remarks     Acrylates - -    124 1 2-Hydroxyethyl Methacrylate (HEMA) - -    125 2 1,4-Butandioldimethacrylate (BUDMA) - -     3  2-Ethylhexyl Acrylate - -     4 Bisphenol-A-Dimethacrylate  - -     5 Diurethane-Dimethacrylate - -     6 Ethyleneglycoldimethacrylate (EGDMA) - -    130 7 Pentaerythritoltriacrylate (MEENU) - -     8 Triethylene Glycol Dimethacrylate (TEGDMA) - -      Synthetic material/additives        9 C-Yamu-Zoyfnwrkflf - -     10 Tricresyl Phosphate - -     11 9-Qntg-Dtdvjoxhdwidl - -    135 12 Dioctyl phtalate(DEHP,DOP) / (Dimethylhexylphthalate)  - -     13 Dibutylphthalate - -     14 Dimethylphthalate - -     15 Toluene-2,4-Diisocyanate NA NA     16 Diphenylmethane-4,4''-Diisocyanate NA NA      EPOXY RESIN SYSTEMS        Reactive Solvents - -    140 17 Cresyl Glycidyl Ether NA NA      18 Butyl Glycidyl Ether - -     19 Phenyl Glycidyl Ether - -     20 1,4-Butanediol Diglycidyl Ether - -     21 1,6-Hexanediole Diglycidyl Ether - -      Hardener / Accelerator - -    145 22 Triethylenetetramine - -     23 Diethylenetriamine - -     24 Isophorone Diamine (IPD) - -     25 N,N-Dimethyl-P-Toluidine - -    149 26 Isobornyl Acrylate - -    METALS (Implants / Dental)        # Substance 2 days 4 days remarks   150 1 Ammonium Heptamolybdate (IV) - -     2 Ammonium Tetrachloroplatinate - -     3 Indium (III) Chloride - -     4 Iridium (III) Chloride - -     5 Ferric Chloride - -    155 6 Manganese (II) Chloride - -     7 Niobium (V) Chloride - -     8 Palladium Chloride - -     9 Silver Nitrate - -     10 Gold Sodium Thiosulfate - -    160 11 Tantal - -     12 Tin (II) Chloride - -     13 Titanium (IV) Oxide - -     14 Titanium - -     15 Tungstic Acid, Sod Salt Dihydrat - -    165 16 Vanadium Pentoxide - -     17 Wolfram - -     18 Zinc Chloride - -     19 Zirconium (IV) Oxide - -     20 Ammoniated Murcury - -    170 21 Copper Sulfate Pentahydrate (+) -     22 Amalgam  - -     23 Aluminum Hydroxide - -    173 24 Ammonium Hexachloroplatinate - -      Results of patch tests:                         Interpretation:  - Negative                    A    = Allergic      (+) Erythema    TI   = Toxic/irritant   + E + Infiltration    RaP = Relevance at Present     ++ E/I + Papulovesicle   Rpr  = Relevance Previously     +++ E/I/P + Blister     nR   = No Relevance    [x] No relevant allergic reaction observed  Slight reaction to Nickel    [] Allergic reaction diagnosed against following allergens:    Interpretation/Remarks:   No clear contact sensitization in patch tests, but patient in prick tests and by hx very atopic and the borderline Nickel allergy fits well into this picture. Dermatitis is therefore mostly atopic dermatitis.     [x] Patient information given   [] ACDS CAMP information (# ....) to following  compounds: .....   [x] General information to following compounds: Nickel      Assessment & Plan:    ==> Final Diagnosis:     # Atopic predisposition with:  Seasonal RC in late spring (tree pollens), summer (grass), and fall (weed pollens)  Sensitization to dust mites --> might explain cold symptoms in winter  Food allergies to peanuts >>> tree nuts ==> throat swelling and urticaria  Shellfish allergy with oral itching  Positive family history  Maybe atopic dermatitis with dry skin and recurrent periorbital dermatitis  * chronic illness with exacerbation, progression, side effects from treatment    # Recurrent periorbital dermatitis right side and scalp dermatitis  >> mostly atopic dermatitis with irritant component  No signs for relevant contact sensitization (slightly to Nickel)  * chronic illness with exacerbation, progression, side effects from treatment    These conclusions are made at the best of one's knowledge and belief based on the provided evidence such as patient's history and allergy test results and they can change over time or can be incomplete because of missing information.    ==> Treatment Plan:    >> Refilled prednisone for emergency set if needed again in the future (rest of emergency set re-prescribed at patient's request on 5/28/24).    >> Continue with regular moisturization after each shower.  - Try to avoid overly-scented products.    >> Continue with ketoconazole shampoo and Clobex shampoo each 2x/week, alternating.    >> Detailed discussion regarding oral IT for peanut and tree nut allergens.  - If patient would like to proceed with oral IT for peanut allergy, recommend consultation with allergist Dr. Cooper Ohara. Patient contact Dr. Ohara's office.  [facility]  Phone: [phone] [address] [place] [address]      Procedures Performed: None    Staff and Scribe: : provider    Follow-up in Derm-Allergy clinic  PRN  ___________________________    I spent a total of 24 minutes with Valentin Gaytan during today s  visit. This time was spent discussing all the individual test results, correlating them to the clinical relevance, counseling the patient and/or coordinating care.      Again, thank you for allowing me to participate in the care of your patient.        Sincerely,        Donell Ho MD

## 2024-05-31 NOTE — PROGRESS NOTES
[facility] Dermato-allergology Note  Office visit  Encounter Date: May 31, 2024  ____________________________________________    CC: Allergy Recheck (Recent emergency set use discussion, completed 12-hr retrospective diary )      HPI:  (May 31, 2024)  Mr. Valentin Gaytan is a(n) 18 year old male who presents today as a return patient for allergy consultation  - Follow-up in Derm-Allergy clinic if necessary  - On 5/26/24, ate pure vanilla soft serve ice cream in a bowl at a restaurant; believes spoon used to serve his ice cream was used to serve other types of ice cream  - Lip swelling started within a few minutes  - Throat became itchy and felt a little bit tighter  - As soon as throat started to hurt, took 2 prednisone and 2 Zyrtec  - Progressed slightly for 4-5 minutes where throat was sore, but he could still breath perfectly fine  - 20-25 minutes of being uncomfortable but did not worsen  - Improved and then resolved over the next 2-3 hours  - Did not use EpiPen  - Unsure of exactly what spoon came into contact with for cross-contamination to have occurred  - Feels peanut is bigger allergy than tree nut  - No issues with soy, green peas, lima beans, or other beans  - Mostly avoids lima beans because he does not like the texture  - Skin is doing good and he has not had any issues  - Using shampoo 2x/week  - Applying lotion after every shower  - Otherwise feeling well in usual state of health    Physical Exam:  General: In no acute distress, well-developed, well-nourished  Eyes: no conjunctivitis  ENT: no signs of rhinitis   Pulmonary: no wheezing or coughing  Skin: no active eczematous skin lesions on visible skin    Earlier History and Allergy exams:  (Sep 29, 2023)  - Follow-up in Derm-Allergy clinic for 2nd readings and final conclusions after 4 days.    (Sep 27, 2023)  - Follow-up in Derm-Allergy clinic for 1st readings of patch tests after 2 days. 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 Stopped that about 3 weeks ago.  - got in Feb 2023 from Dr. Sudhakar Landrum and this improved the situation  - has also some scalp dermatitis with reactions to some shampoos    dandruffs in scalp with slightly erythematous scalp skin and periorbital hyperpigmentations    Past Medical History:   Patient Active Problem List   Diagnosis    Eczema, unspecified type    X-linked Alport syndrome     Past Medical History:   Diagnosis Date    Hematuria     Proteinuria     X-linked Alport syndrome     Mother genetic testing: COL4A5 splice site variant, COL4A5 c.[id number]     Allergies:  Allergies   Allergen Reactions    Peanut-Containing Drug Products Anaphylaxis    Nuts Rash     Tree nuts    Shellfish Allergy Rash     Medications:  Current Outpatient Medications   Medication Sig Dispense Refill    predniSONE (DELTASONE) 50 MG tablet Emergency set: if severe allergic reaction take immediately 100mg Prednisone (2x50mg) and 2 Tabl Cetirizine 10mg and then write precise 12h retrospective diary. If less severe reaction take only the 2 Tabl Cetirizine. Please provide patient with key chain box for these emergency medications 2 tablet 4    amphetamine-dextroamphetamine (ADDERALL XR) 20 MG 24 hr capsule Take 20 mg by mouth daily as needed      amphetamine-dextroamphetamine (ADDERALL) 10 MG tablet  TAKE 1/2 TO 2 TABLETS BY MOUTH TWICE DAILY (4 HOURS APART) AS NEEDED FOR ADHD. *MAX 2 TABS PER DAY*      azelastine (ASTELIN) 0.1 % nasal spray Spray 1 spray into both nostrils 2 times daily 30 mL 3    cetirizine (ZYRTEC) 10 MG tablet Emergency set: if severe allergic reaction take immediately 100mg Prednisone (2x50mg) and 2 Tabl Cetirizine 10mg and then write precise 12h retrospective diary. If less severe reaction take only the 2 Tabl Cetirizine. Please provide patient with key chain box for these emergency medications 30 tablet 1    cetirizine (ZYRTEC) 10 MG tablet Emergency set: if severe allergic reaction take immediately 100mg Prednisone (2x50mg) and 2 Tabl Cetirizine 10mg and then write precise 12h retrospective diary. If less severe reaction take only the 2 Tabl Cetirizine. Please provide patient with key chain box for these emergency medications 10 tablet 1    clobetasol propionate (CLOBEX) 0.05 % external shampoo 2x weekly for hair wash 118 mL 3    dapagliflozin (FARXIGA) 5 MG TABS tablet Take 1 tablet (5 mg) by mouth daily 30 tablet 11    EPINEPHrine (ANY BX GENERIC EQUIV) 0.3 MG/0.3ML injection 2-pack Inject 0.3 mLs (0.3 mg) into the muscle as needed for anaphylaxis May repeat one time in 5-15 minutes if response to initial dose is inadequate. 2 each 1    ketoconazole (NIZORAL) 2 % external shampoo Apply topically twice a week 2 times weekly 120 mL 3    losartan (COZAAR) 25 MG tablet Take 0.5 tablets (12.5 mg) by mouth daily 45 tablet 3    mometasone (ELOCON) 0.1 % external solution Apply topically twice a week 60 mL 3    predniSONE (DELTASONE) 50 MG tablet Emergency set: if severe allergic reaction take immediately 100mg Prednisone (2x50mg) and 2 Tabl Cetirizine 10mg and then write precise 12h retrospective diary. If less severe reaction take only the 2 Tabl Cetirizine. Please provide patient with key chain box for these emergency medications 2 tablet 3     No current facility-administered medications  for this visit.     Social History:  The patient is a student. Patient has the following hobbies or non-occupational exposure: N/A.     Family History:  Family History   Problem Relation Age of Onset    Alport syndrome Mother     Kidney failure Maternal Grandfather         ESKD in his 40s    Alport syndrome Maternal Grandfather     Hearing Loss Maternal Grandfather     Cerebrovascular Disease Maternal Grandfather         Diet at age 50 due to aortic valve infection and stroke    Hematuria Maternal Aunt     Alport syndrome Maternal Aunt         Kidney transplant at age 39    Alport syndrome Maternal Great-Grandmother         ESKD in her 80s-90s, declined dialysis    Diabetes No family hx of     Glaucoma No family hx of     Macular Degeneration No family hx of    Father has strong eczema and got IT in [place]    Previous Labs, Allergy Tests, Dermatopathology, Imaging:  Feb 2023 with Dr. JACK De La Fuente  # Eyelid dermatitis - right upper lid  Atopic predisposition w/ seasonal allergy. Right handed. Suspected ACD. Will start protopic  - Tacrolimus ointment BID - discussed black box warning, application strategies and side effects (burning)  - Dr. Ho scheduled 07/2023 for consideration of patch    Referred By: Migdalia Stringer MD  [address] [place] [address]     Allergy Tests:  Past Allergy Test    Order for Future Allergy Testing:    [x] Outpatient  [] Inpatient: [facility]..../ Bed ....       Skin Atopy (atopic dermatitis) [x] Yes   [] No ...dry skin.  Contact allergies:   [x] Yes   [] No ...band aid some rash for 24h  Hand eczema:   [] Yes   [x] No           Leading hand:   [] R   [] L       [] Ambidextrous         Drug allergies:        [] Yes   [x] No  which?......    Urticaria/Angioedema  [] Yes   [] No .........  Food Allergy:  [x] Yes   [] No  Which?.peanuts = last time accidentally in March with contaminated ice cream and slight throat swelling --> lip/throat swelling and hives --> proven by skin tests about 10  years ago  Tree nuts: almonds > hazelnuts > pistachious = mouth swelling and hives, but no breathing problems  Shellfish = mouth swelling, fish OK, no problems with fruits  Pets :  [] Yes   [x] No  Which?.RC to dogs and cats         [x]  Rhinitis   [x] Conjunctivitis   [] Sinusitis   [] Polyposis   [] Otitis   [] Pharyngitis         []  Postnasal drip    []  none  Operations:   [] Tonsils   [] Septum   [] Sinus   [] Polyposis        [] Asthma bronchiale   [] Coughing      [x]  none  Symptoms (mostly Rhinoconjunctivitis and Asthma) aggravated by:  Season   [] I   [] II   [] III   [] IV   [x]V   [x]VI   []VII   [x]VIII   [x]IX   []X   []XI   []XII     [] perennial   Day time      [] morning   [] noon      [] evening        [] night    [x] whole day........  []  none  Location/changes    [] inside        [x] outside   [] mountains    [] sea     [] others.............   []  none  Triggers, specific     [x] animals     [x] plants     [] dust              [] others ...........................    []  none  Triggers, others       [] work          [] psyche    [] sport            [] others .............................  [x]  none  Irritant                [] phys efforts [] smoke    [] heat/cold     [] odors  []others............... [x]  none    Order for PATCH TESTS  Reason for tests (suspected allergy): in September/October  Known previous allergies: none  Standardized panels  [x] Standard panel (40 tests)  [x] Preservatives & Antimicrobials (31 tests)  [x] Emulsifiers & Additives (25 tests)   [x] Perfumes/Flavours & Plants (25 tests)  [] Hairdresser panel (12 tests)  [] Rubber Chemicals (22 tests)  [x] Plastics (26 tests)  [] Colorants/Dyes/Food additives (20 tests)  [x] Metals (implants/dental) (24 tests)  [] Local anaesthetics/NSAIDs (13 tests)  [] Antibiotics & Antimycotics (14 tests)   [] Corticosteroids (15 tests)   [] Photopatch test (62 tests)   [] others: ...      [] Patient's own products: ...  DO NOT test if  chemical or biological identity is unknown!   always ask from patient the product information and safety sheets (MSDS)       Order for PRICK TESTS    Reason for tests (suspected allergy): atopy with food allergy  Known previous allergies: none    Standardized prick panels  [x] Atopic panel (20 tests)  [] Pediatric Panel (12 tests)  [] Milk, Meat, Eggs, Grains (20 tests)   [] Dust, Epithelia, Feathers (10 tests)  [x] Fish, Seafood, Shellfish (17 tests)  [] Nuts, Beans (14 tests)  [] Spice, Vegetable, Fruit (17 tests)  [] Pollen Panel = Tree, Grass, Weed (24 tests)  [x] Others: peanuts, hazenuts, almonds.      [] Patient's own products: ...  DO NOT test if chemical or biological identity is unknown!   always ask from patient the product information and safety sheets (MSDS)     Standardized intradermal tests  [] Penicillium notatum [] Aspergillus fumigatus [] House dust mites D.far & D. pteron  [] Cat    [] dog  [] Others: ...  [] Bee venom   [] Wasp venom  !!Specific protocol with dilutions!!       Order for Drug allergy tests (prick & Intradermal & patch tests)    [] Penicillin G  [] Ampicillin [] Cefazolin   [] Ceftriaxone   [] Ceftazidime  [] Bactrim    [] Others: ...  Order for ... as test date      Atopy Screen (Placed Jul 12, 2023)  No Substance Readings (15 min) Evaluation   POS Histamine 1mg/ml ++    NEG NaCl 0.9% -      No Substance Readings (15 min) Evaluation   1 Alternaria alternata (tenuis)  +    2 Cladosporium herbarum +    3 Aspergillus fumigatus -    4 Penicillium notatum -    5 Dermatophagoides pteronyssinus ++++    6 Dermatophagoides farinae ++++    7 Dog epithelium (canis spp) -    8 Cat hair (shine catus) ++    9 Cockroach   (Blatella americana & germanica) -    10 Grass mix midwest   (Yazmin, Orchard, Redtop, Wesley) ++    11 Sunday grass (sorghum halepense) ++    12 Weed mix   (common Cocklebur, Lamb s quarters, rough redroot Pigweed, Dock/Sorrel) ++    13 Mug wort (artemisia vulgare) ++    14  Ragweed giant/short (ambrosia spp) ++    15      16 Tree mix 1 (Pecan, Maple BHR, Oak RVW, american [place], black [place]) +/++    17 Red cedar (juniperus virginia) +    18 Tree mix 2   (white Gregory, river/red Birch, black [place], common [place], american Elm) ++    19 Box elder/Maple mix (acer spp) +    20 [place] shagbark (carya ovata) ++    Conclusion:    Fish and Seafood (Placed Jul 12, 2023)  No Substance Readings (15min) Evaluation   POS Histamine 1mg/ml ++    NEG NaCl 0.9% -      No Substance Readings (15min) Evaluation   1 peanuts ++++    2 hazelnut +    3 almond +/++    4 pistachios +    11 Crab (callinectes spp) -    12 Lobster (homarus americanus) -    13 Shrimp white/brown/pink (farfantepenaeus&litopenaeus) -    14 Kingcrab (paralithodes camtschatica) -    15 Scallop (placopecten magellanicus) -    16 Clam (mercenaria mercenaria) -    17 Oyster (cstrea/crassostrea) -    Conclusion:    Nuts and Beans (Placed Sep 27, 2023)  No Substance Readings (15min) Evaluation   POS Histamine 1mg/ml ++    NEG NaCl 0.9% -      No Substance Readings (15min) Evaluation   1 [place] (prunus dulcis) ++    2 Brazil nut (bertholletia excels) -    3 Cashew nut (anacardium occidentale) -    4 Coconut (cocos nucifera) -    5 Hazelnut (corylus americana) -    6 Pecan (carya illinoinensis) -    7 [place] (juglans regia) -    8 Pistachio nut (pistacia vera) -    9 peanuts +++    10 [place] Locke (phaseolus lunatus) ++    11 String [place] (phaseolus vulgaris) -    12 Kidney bean (phaseolus vulgaris) -    13 Green Pea (pisum sativum) +/++    14 Soybean (glycine max) (+)    15 Fresh shrimp prick/prick ++    Conclusion: mostly reactions to beans and peanut, less to nuts (except almond)    RESULTS & EVALUATION of PATCH TESTS    Sep 25, 2023 application of patch tests:    Patch test readings after     [x] 2 days, [] 3 days [x] 4 days, [] 5 days,  Other duration: ...    STANDARD Series                                          # Substance 2 days  4 days remarks     1 Garret Mix [C] - -       2 Colophony - -       3  2-Mercaptobenzothiazole  - -       4 Methylisothiazolinone - -       5 Carba Mix - -       6 Thiuram Mix [A] - -       7 Bisphenol A Epoxy Resin - -       8 T-Syiz-Tmubtbszvkl-Formaldehyde Resin - -       9 Mercapto Mix [A] - -       10 Black Rubber Mix- PPD [B] - -       11 Potassium Dichromate  -  -       12 Balsam of Peru (Myroxylon Pereirae Resin) - -       13 Nickel Sulphate Hexahydrate - +       14 Mixed Dialkyl Thiourea - -       15 Paraben Mix [B] - -       16 Methyldibromo Glutaronitrile - -       17 Fragrance Mix 8% - -       18 2-Bromo-2-Nitropropane-1,3-Diol (Bronopol) CT - -       19 Lyral - -       20 Tixocortol-21- Pivalate CT - -       21 Diazolidinyl urea (Germall II) - -        22 Methyl Methacrylate - -       23 Cobalt (II) Chloride Hexahydrate (+) -       24 Fragrance Mix II  - -       25 Compositae Mix - -       26 Benzoyl Peroxide - -       27 Bacitracin - -       28 Formaldehyde - -       29 Methylchloroisothiazolinone / Methylisothiazolinone - -       30 Corticosteroid Mix CT - -       31 Sodium Lauryl Sulfate - -       32 Lanolin Alcohol - -       33 Turpentine - -       34 Cetylstearylalcohol - -       35 Chlorhexidine Dicluconate - -       36 Budesonide - -       37 Imidazolidinyl Urea  - -       38 Ethyl-2 Cyanoacrylate NA NA       39 Quaternium 15 (Dowicil 200) - -       40 Decyl Glucoside - -     PRESERVATIVES & ANTIMICROBIALS        # Substance 2 days 4 days remarks   41 1  1,2-Benzisothiazoline-3-One, Sodium Salt - -     2  1,3,5-Pablo (2-Hydroxyethyl) - Hexahydrotriazine (Grotan BK) - -     3 1-Wvwmvbxepyvot-7-Nitro-1, 3-Propanediol - -     4  3, 4, 4' - Triclocarban - -    45 5 4 - Chloro - 3 - Cresol - -     6 4 - Chloro - 4 - Xylenol (PCMX) - -     7 7-Ethylbicyclooxazolidine (Bioban [id number]) - -     8 Benzalkonium Chloride CT - -     9 Benzyl Alcohol - -    50 10 Cetalkonium Chloride - -     11 Cetylpyrimidine  Chloride  - -     12 Chloroacetamide - -     13 DMDM Hydantoin - -     14 Glutaraldehyde - -    55 15 Triclosan - -     16 Glyoxal Trimeric Dihydrate - -     17 Iodopropynyl Butylcarbamate - -     18 Octylisothiazoline - -     19 Bithionol CT NA NA    60 20 Bioban P 1487 (Nitrobutyl) Morpholine/(Ethylnitro-Trimethylene) Dimorpholine - -     21 Phenoxyethanol - -     22 Phenyl Salicylate - -     23 Povidone Iodine - -     24 Sodium Benzoate - -    65 25 Sodium Disulfite - -     26 Sorbic Acid - -     27 Thimerosal - -     28 Melamine Formaldehyde Resin - -     29 Ethylenediamine Dihydrochloride - -      Parabens      70 30 Butyl-P-Hydroxybenzoate - -     31 Ethyl-P-Hydroxybenzoate - -     32 Methyl-P-Hydroxybenzoate - -    73 33 Propyl-P-Hydroxybenzoate - -    EMULSIFIERS & ADDITIVES       # Substance 2 days 4 days remarks   74 1 Polyethylene Glycol-400 - -    75 2 Cocamidopropyl Betaine - -     3 Amerchol L101 - -     4 Propylene Glycol - -     5 Triethanolamine - -     6 Sorbitane Sesquiolate CT - -    80 7 Isopropylmyristate - -     8 Polysorbate 80 CT - -     9 Amidoamine   (Stearamidopropyl Dimethylamine) - -     10 Oleamidopropyl Dimethylamine - -     11 Lauryl Glucoside - -    85 12 Coconut Diethanolamide  - -     13 2-Hydroxy-4-Methoxy Benzophenone (Oxybenzone) - -     14 Benzophenone-4 (Sulisobenzon) - -     15 Propolis - -     16 Dexpanthenol - -    90 17 Abitol - -     18 Tert-Butylhydroquinone - -     19 Benzyl Salicylate - -     20 Dimethylaminopropylamin (DMPA) - -     21 Zinc Pyrithione (Zinc Omadine)  - -    95 22 Pablo(Hydroxymethyl) Nitromethane  - -      Antioxidant       23 Dodecyl Gallate - -     24 Butylhydroxyanisole (BHA) - -     25 Butylhydroxytoluene (BHT) - -     26 Di-Alpha-Tocopherol (Vit E) - -    100 27 Propyl Gallate - -    PERFUMES, FLAVORS & PLANTS        # Substance 2 days 4 days remarks   101 1 Benzyl Cinnamate - -     2 Di-Limonene (Dipentene) - -     3 Cananga Odorata (Ylang  Jonelle) (I) - -     4 Lichen Acid Mix - -    105 5 Mentha Piperita Oil (Peppermint Oil) - -     6 Sesquiterpenelactone mix - -     7 Tea Tree Oil, Oxidized - -     8 Wood Tar Mix (+) -     9 Abietic Acid - -    110 10 Lavendula Angustifolia Oil (Lavender Oil) - -     11 Fragrance mix II CT * 14% - -      Fragrance Mix I       12 [place]moss Absolute - -     13 Eugenol - -     14 Geraniol - -    115 15 Hydroxycitronellal - -     16 Isoeugenol - -     17 Cinnamic Aldehyde - -     18 Cinnamic Alcohol  - -      Fragrance mix II       19 Citronellol - -    120 20 Alpha-Hexylcinnamic Aldehyde    - -     21 Citral - -     22 Farnesol - -    123 23 Coumarin - -    Hexylcinnamic aldehyde, Coumarin, Farnesol, Hydroxyisohexy3-cyclohexene carboxaldehyde, citral, citrolellol  PLASTICS (Acrylates/Epoxy Systems)       # Substance 2 days 4 days remarks     Acrylates - -    124 1 2-Hydroxyethyl Methacrylate (HEMA) - -    125 2 1,4-Butandioldimethacrylate (BUDMA) - -     3  2-Ethylhexyl Acrylate - -     4 Bisphenol-A-Dimethacrylate  - -     5 Diurethane-Dimethacrylate - -     6 Ethyleneglycoldimethacrylate (EGDMA) - -    130 7 Pentaerythritoltriacrylate (MEENU) - -     8 Triethylene Glycol Dimethacrylate (TEGDMA) - -      Synthetic material/additives        9 F-Pyla-Ssrikieogtn - -     10 Tricresyl Phosphate - -     11 0-Ybkt-Kpqzjdtmxjzat - -    135 12 Dioctyl phtalate(DEHP,DOP) / (Dimethylhexylphthalate)  - -     13 Dibutylphthalate - -     14 Dimethylphthalate - -     15 Toluene-2,4-Diisocyanate NA NA     16 Diphenylmethane-4,4''-Diisocyanate NA NA      EPOXY RESIN SYSTEMS        Reactive Solvents - -    140 17 Cresyl Glycidyl Ether NA NA     18 Butyl Glycidyl Ether - -     19 Phenyl Glycidyl Ether - -     20 1,4-Butanediol Diglycidyl Ether - -     21 1,6-Hexanediole Diglycidyl Ether - -      Hardener / Accelerator - -    145 22 Triethylenetetramine - -     23 Diethylenetriamine - -     24 Isophorone Diamine (IPD) - -     25  N,N-Dimethyl-P-Toluidine - -    149 26 Isobornyl Acrylate - -    METALS (Implants / Dental)        # Substance 2 days 4 days remarks   150 1 Ammonium Heptamolybdate (IV) - -     2 Ammonium Tetrachloroplatinate - -     3 Indium (III) Chloride - -     4 Iridium (III) Chloride - -     5 Ferric Chloride - -    155 6 Manganese (II) Chloride - -     7 Niobium (V) Chloride - -     8 Palladium Chloride - -     9 Silver Nitrate - -     10 Gold Sodium Thiosulfate - -    160 11 Tantal - -     12 Tin (II) Chloride - -     13 Titanium (IV) Oxide - -     14 Titanium - -     15 Tungstic Acid, Sod Salt Dihydrat - -    165 16 Vanadium Pentoxide - -     17 Wolfram - -     18 Zinc Chloride - -     19 Zirconium (IV) Oxide - -     20 Ammoniated Murcury - -    170 21 Copper Sulfate Pentahydrate (+) -     22 Amalgam  - -     23 Aluminum Hydroxide - -    173 24 Ammonium Hexachloroplatinate - -      Results of patch tests:                         Interpretation:  - Negative                    A    = Allergic      (+) Erythema    TI   = Toxic/irritant   + E + Infiltration    RaP = Relevance at Present     ++ E/I + Papulovesicle   Rpr  = Relevance Previously     +++ E/I/P + Blister     nR   = No Relevance    [x] No relevant allergic reaction observed  Slight reaction to Nickel    [] Allergic reaction diagnosed against following allergens:    Interpretation/Remarks:   No clear contact sensitization in patch tests, but patient in prick tests and by hx very atopic and the borderline Nickel allergy fits well into this picture. Dermatitis is therefore mostly atopic dermatitis.     [x] Patient information given   [] ACDS CAMP information (# ....) to following compounds: .....   [x] General information to following compounds: Nickel      Assessment & Plan:    ==> Final Diagnosis:     # Atopic predisposition with:  Seasonal RC in late spring (tree pollens), summer (grass), and fall (weed pollens)  Sensitization to dust mites --> might explain cold  symptoms in winter  Food allergies to peanuts >>> tree nuts ==> throat swelling and urticaria  Shellfish allergy with oral itching  Positive family history  Maybe atopic dermatitis with dry skin and recurrent periorbital dermatitis  * chronic illness with exacerbation, progression, side effects from treatment    # Recurrent periorbital dermatitis right side and scalp dermatitis  >> mostly atopic dermatitis with irritant component  No signs for relevant contact sensitization (slightly to Nickel)  * chronic illness with exacerbation, progression, side effects from treatment    These conclusions are made at the best of one's knowledge and belief based on the provided evidence such as patient's history and allergy test results and they can change over time or can be incomplete because of missing information.    ==> Treatment Plan:    >> Refilled prednisone for emergency set if needed again in the future (rest of emergency set re-prescribed at patient's request on 5/28/24).    >> Continue with regular moisturization after each shower.  - Try to avoid overly-scented products.    >> Continue with ketoconazole shampoo and Clobex shampoo each 2x/week, alternating.    >> Detailed discussion regarding oral IT for peanut and tree nut allergens.  - If patient would like to proceed with oral IT for peanut allergy, recommend consultation with allergist Dr. Cooper Ohara. Patient contact Dr. Ohara's office.  [facility]  Phone: [phone] [address], [facility] [place] [address]      Procedures Performed: None    Staff and Scribe: : provider    Follow-up in Derm-Allergy clinic PRN  ___________________________    I spent a total of 24 minutes with Valentin Gaytan during today s  visit. This time was spent discussing all the individual test results, correlating them to the clinical relevance, counseling the patient and/or coordinating care.

## 2024-05-31 NOTE — PATIENT INSTRUCTIONS
>> Detailed discussion regarding oral IT for peanut and tree nut allergens.  - If patient would like to proceed with oral IT for peanut allergy, recommend consultation with allergist Dr. Cooper Ohara. Patient contact Dr. Ohara's office.  [facility]  Phone: [phone] [address], [facility] [place] [address]

## 2024-06-06 DIAGNOSIS — Z00.6 EXAMINATION OF PARTICIPANT IN CLINICAL TRIAL: Primary | ICD-10-CM

## 2024-06-07 ENCOUNTER — TELEPHONE (OUTPATIENT)
Dept: OPHTHALMOLOGY | Facility: CLINIC | Age: 19
End: 2024-06-07
Payer: COMMERCIAL

## 2024-06-07 NOTE — TELEPHONE ENCOUNTER
Patient confirmed rescheduled appointment:  Date: 6/27/24  Time: 8:00am  Visit type: New Adult Eye  Provider:   Location: [facility] Location  Testing/imaging: R3R Study  Additional notes: R3R study (Dr. Metz). -Appt only with -Appt Per PT and Krysta via email with  (Research Appointment)     Patient was provided appointment details over the phone.-Per Patient

## 2024-06-18 ENCOUNTER — HOSPITAL ENCOUNTER (OUTPATIENT)
Dept: RESEARCH | Facility: CLINIC | Age: 19
Discharge: HOME | End: 2024-06-18
Attending: INTERNAL MEDICINE | Admitting: INTERNAL MEDICINE
Payer: COMMERCIAL

## 2024-06-18 VITALS
HEART RATE: 86 BPM | HEIGHT: 72 IN | SYSTOLIC BLOOD PRESSURE: 128 MMHG | BODY MASS INDEX: 20.28 KG/M2 | WEIGHT: 149.69 LBS | DIASTOLIC BLOOD PRESSURE: 70 MMHG

## 2024-06-18 PROCEDURE — 510N000017 HC CRU PATIENT CARE, PER 15 MIN

## 2024-06-18 PROCEDURE — 510N000009 HC RESEARCH FACILITY, PER 15 MIN

## 2024-06-18 PROCEDURE — 300N000003 HC RESEARCH SPECIMEN PROCESSING, SIMPLE

## 2024-06-18 NOTE — ADDENDUM NOTE
Encounter addended by: Lilly Pendleton on: 6/18/2024 2:30 PM   Actions taken: Charge Capture section accepted

## 2024-06-20 ENCOUNTER — OFFICE VISIT (OUTPATIENT)
Dept: NEPHROLOGY | Facility: CLINIC | Age: 19
End: 2024-06-20
Attending: PEDIATRICS
Payer: COMMERCIAL

## 2024-06-20 DIAGNOSIS — Q87.81 X-LINKED ALPORT SYNDROME: Primary | ICD-10-CM

## 2024-06-20 DIAGNOSIS — Z00.6 EXAMINATION OF PARTICIPANT IN CLINICAL TRIAL: ICD-10-CM

## 2024-06-20 PROCEDURE — 99207 PR NO CHARGE-RESEARCH SERVICE: CPT | Performed by: PEDIATRICS

## 2024-06-20 NOTE — LETTER
6/20/2024      RE: Valentin Gaytan  [address] [facility] [address]     Dear Colleague,    Thank you for the opportunity to participate in the care of your patient, Valentin Gaytan, at the [facility] at [facility]. Please see a copy of my visit note below.    Visit for clinical trial informed consent visit only.     Sincerely,       Jaz Metz MD

## 2024-06-24 ENCOUNTER — OFFICE VISIT (OUTPATIENT)
Dept: AUDIOLOGY | Facility: CLINIC | Age: 19
End: 2024-06-24
Attending: PEDIATRICS
Payer: COMMERCIAL

## 2024-06-24 PROCEDURE — 999N000104 HC STATISTIC NO CHARGE: Performed by: AUDIOLOGIST

## 2024-06-24 NOTE — PROGRESS NOTES
AUDIOLOGY REPORT    SUBJECTIVE: Valentin Gaytan, 18 year old male was seen in the [facility] at the [facility] on 6/24/2024 for a pediatric hearing evaluation, referred by Prasanna Aguilar M.D., per the [facility] study.     No hearing concerns. Mother diagnosed with hearing loss in her 30's. Reports some lightheadedness. Denies tinnitus, dizziness, pain, pressure, or drainage.    OBJECTIVE:  Otoscopy revealed clear ear canals. Tympanograms revealed normal eardrum mobility bilaterally. Good reliability was obtained to standard techniques using circumaural headphones. Results were obtained from 125-8000 Hz and revealed normal hearing bilaterally.    ASSESSMENT: Today s results indicate normal hearing bilaterally. Today s results were discussed with Valentin in detail.     PLAN: It is recommended that Valentin return for repeat audiologic testing per study protocol.  Please call this clinic with questions regarding these results or recommendations.    Sarthak Lopez, CCC-A  Audiologist, [place] #[id number]

## 2024-06-26 ENCOUNTER — OFFICE VISIT (OUTPATIENT)
Dept: NEPHROLOGY | Facility: CLINIC | Age: 19
End: 2024-06-26
Attending: PEDIATRICS
Payer: COMMERCIAL

## 2024-06-26 DIAGNOSIS — Z00.6 EXAMINATION OF PARTICIPANT IN CLINICAL TRIAL: Primary | ICD-10-CM

## 2024-06-26 PROCEDURE — 99207 PR NO CHARGE-RESEARCH SERVICE: CPT | Performed by: PEDIATRICS

## 2024-06-26 NOTE — LETTER
6/26/2024      RE: Valentin Gaytan  [address] [facility] [address]     Dear Colleague,    Thank you for the opportunity to participate in the care of your patient, Valentin Gaytan, at the [facility] at [facility]. Please see a copy of my visit note below.    Clinical trial screening visit only    Please do not hesitate to contact me if you have any questions/concerns.     Sincerely,       Jaz Metz MD

## 2024-06-27 ENCOUNTER — OFFICE VISIT (OUTPATIENT)
Dept: OPHTHALMOLOGY | Facility: CLINIC | Age: 19
End: 2024-06-27
Payer: COMMERCIAL

## 2024-06-27 DIAGNOSIS — H52.223 REGULAR ASTIGMATISM OF BOTH EYES: ICD-10-CM

## 2024-06-27 DIAGNOSIS — H47.393 OPTIC NERVE CUPPING OF BOTH EYES: ICD-10-CM

## 2024-06-27 DIAGNOSIS — H02.883 MEIBOMIAN GLAND DYSFUNCTION (MGD) OF BOTH EYES: ICD-10-CM

## 2024-06-27 DIAGNOSIS — H02.886 MEIBOMIAN GLAND DYSFUNCTION (MGD) OF BOTH EYES: ICD-10-CM

## 2024-06-27 DIAGNOSIS — Q87.81 X-LINKED ALPORT SYNDROME: Primary | ICD-10-CM

## 2024-06-27 PROCEDURE — 92004 COMPRE OPH EXAM NEW PT 1/>: CPT | Performed by: OPTOMETRIST

## 2024-06-27 PROCEDURE — 92015 DETERMINE REFRACTIVE STATE: CPT | Performed by: OPTOMETRIST

## 2024-06-27 PROCEDURE — 92133 CPTRZD OPH DX IMG PST SGM ON: CPT | Performed by: OPTOMETRIST

## 2024-06-27 ASSESSMENT — VISUAL ACUITY
OS_SC+: -1
METHOD: SNELLEN - LINEAR
OS_SC: 20/20
OD_SC: 20/20
OS_SC: J1+
OD_SC: J1+
OD_SC+: -1

## 2024-06-27 ASSESSMENT — CONF VISUAL FIELD
OS_INFERIOR_NASAL_RESTRICTION: 0
OD_INFERIOR_NASAL_RESTRICTION: 0
METHOD: COUNTING FINGERS
OD_SUPERIOR_TEMPORAL_RESTRICTION: 0
OS_SUPERIOR_TEMPORAL_RESTRICTION: 0
OS_NORMAL: 1
OD_INFERIOR_TEMPORAL_RESTRICTION: 0
OD_SUPERIOR_NASAL_RESTRICTION: 0
OS_INFERIOR_TEMPORAL_RESTRICTION: 0
OD_NORMAL: 1
OS_SUPERIOR_NASAL_RESTRICTION: 0

## 2024-06-27 ASSESSMENT — CUP TO DISC RATIO
OS_RATIO: 0.65
OD_RATIO: 0.65

## 2024-06-27 ASSESSMENT — EXTERNAL EXAM - LEFT EYE: OS_EXAM: WNL

## 2024-06-27 ASSESSMENT — TONOMETRY
IOP_METHOD: ICARE
OD_IOP_MMHG: 14
OS_IOP_MMHG: 16

## 2024-06-27 ASSESSMENT — REFRACTION_MANIFEST
OS_CYLINDER: +0.50
OD_CYLINDER: +0.25
OS_SPHERE: -0.50
OD_SPHERE: -0.25
OS_AXIS: 093
OD_AXIS: 103

## 2024-06-27 ASSESSMENT — EXTERNAL EXAM - RIGHT EYE: OD_EXAM: WNL

## 2024-06-27 NOTE — PROGRESS NOTES
History  HPI       COMPREHENSIVE EYE EXAM    In both eyes.  Associated symptoms include Negative for dryness and discharge.  Treatments tried include no treatments.  Pain was noted as 0/10. Additional comments: Comprehensive exam             Comments    Patient here for the pre-qualification testing for the R3R study (Dr. Metz). The actual testing would be in the next 2 weeks.   Not having any issues with each eye. Seeing fine both distance and near. Not using any glasses or contacts.   Previous history with eczema affecting eye lids 18 month ago resolved with Protopic that resolved in a few week and no flare ups since then.    POP Frausto COT 8:19 AM June 27, 2024                 Last edited by Siri Cano COT on 6/27/2024  8:19 AM.          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 Form completed documenting normal pupil testing, color vision, IOP, and slit lamp examination. Follow-up as directed by .    (H02.883,  H02.886) Meibomian gland dysfunction (MGD) of both eyes  Comment: Minimally symptomatic  Plan:  Recommended Ocusoft lid scrubs twice weekly. Monitor as needed.    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 Monitor annually.    (H52.223) Regular astigmatism of both eyes  Comment: Minimal refractive error, happy with uncorrected acuity  Plan:  REFRACTION   Offered spectacle prescription, patient declined. Monitor annually.    Complete documentation of historical and exam elements from today's encounter can  be found in the full encounter summary report (not reduplicated in this progress  note). I personally obtained the chief complaint(s) and history of  present illness. I  confirmed and edited as necessary the review of systems, past medical/surgical  history, family history, social history, and examination findings as documented by  others; and I examined the patient myself. I personally reviewed the relevant tests,  images, and reports as documented above. I formulated and edited as necessary the  assessment and plan and discussed the findings and management plan with the  patient and family.    Ubaldo Ardon OD, FAAO

## 2024-06-27 NOTE — NURSING NOTE
Chief Complaints and History of Present Illnesses   Patient presents with    COMPREHENSIVE EYE EXAM     Comprehensive exam     Chief Complaint(s) and History of Present Illness(es)       COMPREHENSIVE EYE EXAM              Laterality: both eyes    Associated symptoms: Negative for dryness and discharge    Treatments tried: no treatments    Pain scale: 0/10    Comments: Comprehensive exam              Comments    Patient here for the pre-qualification testing for the R3R study (Dr. Metz). The actual testing would be in the next 2 weeks.   Not having any issues with each eye. Seeing fine both distance and near. Not using any glasses or contacts.   Previous history with eczema affecting eye lids 18 month ago resolved with Protopic that resolved in a few week and no flare ups since then.    Siri Cano, COT COT 8:19 AM June 27, 2024

## 2024-07-17 DIAGNOSIS — Z00.6 EXAMINATION OF PARTICIPANT IN CLINICAL TRIAL: ICD-10-CM

## 2024-07-17 LAB
ATRIAL RATE - MUSE: 73 BPM
DIASTOLIC BLOOD PRESSURE - MUSE: NORMAL MMHG
INTERPRETATION ECG - MUSE: NORMAL
P AXIS - MUSE: 76 DEGREES
PR INTERVAL - MUSE: 108 MS
QRS DURATION - MUSE: 86 MS
QT - MUSE: 404 MS
QTC - MUSE: 459 MS
R AXIS - MUSE: 91 DEGREES
SYSTOLIC BLOOD PRESSURE - MUSE: NORMAL MMHG
T AXIS - MUSE: 76 DEGREES
VENTRICULAR RATE- MUSE: 73 BPM

## 2024-10-09 ENCOUNTER — LAB (OUTPATIENT)
Dept: LAB | Facility: CLINIC | Age: 19
End: 2024-10-09
Payer: COMMERCIAL

## 2024-10-09 DIAGNOSIS — Q87.81 X-LINKED ALPORT SYNDROME: ICD-10-CM

## 2024-10-09 LAB
ALBUMIN MFR UR ELPH: 271 MG/DL
ALBUMIN SERPL BCG-MCNC: 4.4 G/DL (ref 3.5–5.2)
ANION GAP SERPL CALCULATED.3IONS-SCNC: 9 MMOL/L (ref 7–15)
BASOPHILS # BLD AUTO: 0.1 10E3/UL (ref 0–0.2)
BASOPHILS NFR BLD AUTO: 1 %
BUN SERPL-MCNC: 11 MG/DL (ref 6–20)
CALCIUM SERPL-MCNC: 9.9 MG/DL (ref 8.8–10.4)
CHLORIDE SERPL-SCNC: 103 MMOL/L (ref 98–107)
CREAT SERPL-MCNC: 1 MG/DL (ref 0.67–1.17)
CREAT UR-MCNC: 96.7 MG/DL
EGFRCR SERPLBLD CKD-EPI 2021: >90 ML/MIN/1.73M2
EOSINOPHIL # BLD AUTO: 0.3 10E3/UL (ref 0–0.7)
EOSINOPHIL NFR BLD AUTO: 4 %
ERYTHROCYTE [DISTWIDTH] IN BLOOD BY AUTOMATED COUNT: 12.3 % (ref 10–15)
GLUCOSE SERPL-MCNC: 87 MG/DL (ref 70–99)
HCO3 SERPL-SCNC: 31 MMOL/L (ref 22–29)
HCT VFR BLD AUTO: 45.9 % (ref 40–53)
HGB BLD-MCNC: 16.3 G/DL (ref 13.3–17.7)
IMM GRANULOCYTES # BLD: 0 10E3/UL
IMM GRANULOCYTES NFR BLD: 0 %
LYMPHOCYTES # BLD AUTO: 2.7 10E3/UL (ref 0.8–5.3)
LYMPHOCYTES NFR BLD AUTO: 32 %
MCH RBC QN AUTO: 30.9 PG (ref 26.5–33)
MCHC RBC AUTO-ENTMCNC: 35.5 G/DL (ref 31.5–36.5)
MCV RBC AUTO: 87 FL (ref 78–100)
MONOCYTES # BLD AUTO: 0.7 10E3/UL (ref 0–1.3)
MONOCYTES NFR BLD AUTO: 8 %
NEUTROPHILS # BLD AUTO: 4.6 10E3/UL (ref 1.6–8.3)
NEUTROPHILS NFR BLD AUTO: 55 %
NRBC # BLD AUTO: 0 10E3/UL
NRBC BLD AUTO-RTO: 0 /100
PHOSPHATE SERPL-MCNC: 3.8 MG/DL (ref 2.5–4.5)
PLATELET # BLD AUTO: 175 10E3/UL (ref 150–450)
POTASSIUM SERPL-SCNC: 3.5 MMOL/L (ref 3.4–5.3)
PROT/CREAT 24H UR: 2.8 MG/MG CR (ref 0–0.2)
RBC # BLD AUTO: 5.28 10E6/UL (ref 4.4–5.9)
SODIUM SERPL-SCNC: 143 MMOL/L (ref 135–145)
WBC # BLD AUTO: 8.5 10E3/UL (ref 4–11)

## 2024-10-09 PROCEDURE — 36415 COLL VENOUS BLD VENIPUNCTURE: CPT | Performed by: PATHOLOGY

## 2024-10-09 PROCEDURE — 80069 RENAL FUNCTION PANEL: CPT | Performed by: PATHOLOGY

## 2024-10-09 PROCEDURE — 84156 ASSAY OF PROTEIN URINE: CPT | Performed by: PATHOLOGY

## 2024-10-09 PROCEDURE — 85025 COMPLETE CBC W/AUTO DIFF WBC: CPT | Performed by: PATHOLOGY

## 2024-10-10 ENCOUNTER — OFFICE VISIT (OUTPATIENT)
Dept: NEPHROLOGY | Facility: CLINIC | Age: 19
End: 2024-10-10
Attending: INTERNAL MEDICINE
Payer: COMMERCIAL

## 2024-10-10 VITALS
SYSTOLIC BLOOD PRESSURE: 128 MMHG | OXYGEN SATURATION: 98 % | HEART RATE: 65 BPM | DIASTOLIC BLOOD PRESSURE: 84 MMHG | BODY MASS INDEX: 21.01 KG/M2 | TEMPERATURE: 97.5 F | WEIGHT: 155.6 LBS

## 2024-10-10 DIAGNOSIS — Q87.81 X-LINKED ALPORT SYNDROME: ICD-10-CM

## 2024-10-10 DIAGNOSIS — R80.1 PERSISTENT PROTEINURIA, UNSPECIFIED: ICD-10-CM

## 2024-10-10 DIAGNOSIS — R80.1 PERSISTENT PROTEINURIA: ICD-10-CM

## 2024-10-10 PROCEDURE — 99213 OFFICE O/P EST LOW 20 MIN: CPT | Performed by: INTERNAL MEDICINE

## 2024-10-10 RX ORDER — DIPHENHYDRAMINE HCL 25 MG
TABLET ORAL PRN
COMMUNITY

## 2024-10-10 RX ORDER — DAPAGLIFLOZIN 10 MG/1
10 TABLET, FILM COATED ORAL DAILY
Qty: 90 TABLET | Refills: 3 | Status: SHIPPED | OUTPATIENT
Start: 2024-10-10

## 2024-10-10 ASSESSMENT — PAIN SCALES - GENERAL: PAINLEVEL: NO PAIN (0)

## 2024-10-10 NOTE — PATIENT INSTRUCTIONS
Please increase Dapagliflozin to 10 mg daily (take 2x5 mg tab then switch to 1x 10 mg tablet when you run out of the 5 mg tablets).

## 2024-10-10 NOTE — LETTER
"10/10/2024       RE: Valentin Gaytan  [address] [facility] [address]     Dear Colleague,    Thank you for referring your patient, Valentin Gaytan, to the [facility] [place] at [facility]. Please see a copy of my visit note below.    Nephrology Clinic    Valentin Gaytan MRN:[id number] YOB: 2005  Date of Service: 10/10/2024  Primary care provider: Morteza Sanchez  Requesting physician: Morteza Sanchez       REASON FOR CONSULT: X linked Alport syndrome, transition of care from Pediatric to Adult Nephrology    HISTORY OF PRESENT ILLNESS:   Valentin Gaytan is a 18 year old male who presents for evaluation of X linked Alport syndrome (Mother genetic testing: COL4A5 splice site variant, COL4A5 c.891+1G>A).  Was previously followed by Dr Jaz Metz (last seen in May 2023).  From Dr Metz's notes, Valentin has had proteinuria around 1.3-1.9 g/d.  He has however not been able to tolerate even small doses of ACEi or ARB due to dizziness.  Consideration/discussion was made about adding an SGLT2i.  Feels generally well.  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  Denies L Ext swelling. Urine is not foamy.  Anxiety and ADHD on therapy.  Followed by therapist.   Happy at school.  Sophomore computer engineering.    Audiology evaluation on 23  indicated normal hearing bilaterally.   Ophthalmology evaluations in  and 2023 were for dermatitis, blepharitis -> a full ophthalmo examination and evaluation for Alport s. Was not performed.(?).   denies any ocular/visual problems.    Mom has hearing loss. (Started in late 's)   Mat GF had Alport -  from GB dis.   Aunt had ESKD in late \"s -> kidney transplant -> failed -> back on dialysis.    No Siblings    JANE participant.  Upcoming study visit.    2024  Started farxiga a month ago.   Tolerating well.  No GI or  spm.  No l. ext swelling  No change " "in hearing.  Had 2 previous audiology tests neither had abnormalities.  ROS is otherwise unremarkable.     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  BP at home is lower than in clinic today.  Tolerating SGLT2 well.    Enrolled in the R3R study, but had to withdraw bec of time commitment and conflict with work schedule  ROS neg for Pulm, CV, GI,  spms  PAST MEDICAL HISTORY:  Past Medical History:   Diagnosis Date     Hematuria      Proteinuria      X-linked Alport syndrome     Mother genetic testing: COL4A5 splice site variant, COL4A5 c.891+1G>A     PAST SURGICAL HISTORY:  No past surgical history on file.  MEDICATIONS: reviewed on 24  Prescription Medications as of 10/10/2024         Rx Number Disp Refills Start End Last Dispensed Date Next Fill Date Owning Pharmacy    amphetamine-dextroamphetamine (ADDERALL XR) 20 MG 24 hr capsule  -- -- 2024 --       Sig: Take 20 mg by mouth daily as needed    Class: Historical    Route: Oral    azelastine (ASTELIN) 0.1 % nasal spray  30 mL 3 2023 --   [address]    Sig: Spray 1 spray into both nostrils 2 times daily    Class: E-Prescribe    Route: Both Nostrils    Class: E-Prescribe    cetirizine (ZYRTEC) 10 MG tablet  10 tablet 1 2023 --   [place], [place] - [place], [place] - [address]    Sig: Emergency set: if severe allergic reaction take immediately 100mg Prednisone (2x50mg) and 2 Tabl Cetirizine 10mg and then write precise 12h retrospective diary. If less severe reaction take only the 2 Tabl Cetirizine. Please provide patient with key chain box for these emergency medications.   TAkes daily as needed for seasonal allergies (spring and fall)    Class: E-Prescribe    clobetasol propionate (CLOBEX) 0.05 % external shampoo  118 mL 3 2023 --   [address]    Six weekly for hair " wash    Class: E-Prescribe    dapagliflozin (FARXIGA) 5 MG TABS tablet  30 tablet 11 1/18/2024 --   [address]    Sig: Take 1 tablet (5 mg) by mouth daily    Class: E-Prescribe    Route: Oral    EPINEPHrine (ANY BX GENERIC EQUIV) 0.3 MG/0.3ML injection 2-pack  2 each 1 7/12/2023 --   [facility] [place] - [address]    Sig: Inject 0.3 mLs (0.3 mg) into the muscle as needed for anaphylaxis May repeat one time in 5-15 minutes if response to initial dose is inadequate.    Class: E-Prescribe    Route: Intramuscular    ketoconazole (NIZORAL) 2 % external shampoo  120 mL 3 10/2/2023 --   [address]    Sig: Apply topically twice a week 2 times weekly    Class: E-Prescribe    Route: Topical    losartan (COZAAR) 25 MG tablet  45 tablet 3 2/12/2024 --   [address]    Sig: Take 0.5 tablets (12.5 mg) by mouth daily    Class: E-Prescribe    Route: Oral    mometasone (ELOCON) 0.1 % external solution  60 mL 3 10/9/2023 --   [address]    Sig: Apply topically twice a week    Class: E-Prescribe    Route: Topical    predniSONE (DELTASONE) 50 MG tablet  2 tablet 4 5/31/2024 --   [address]    Sig: Emergency set: if severe allergic reaction take immediately 100mg Prednisone (2x50mg) and 2 Tabl Cetirizine 10mg and then write precise 12h retrospective diary. If less severe reaction take only the 2 Tabl Cetirizine. Please provide patient with key chain box for these emergency medications    Class: E-Prescribe    predniSONE (DELTASONE) 50 MG tablet  2 tablet 3 7/12/2023 --   [facility] [address]    Sig: Emergency set: if severe allergic reaction take immediately 100mg Prednisone (2x50mg) and 2 Tabl  Cetirizine 10mg and then write precise 12h retrospective diary. If less severe reaction take only the 2 Tabl Cetirizine. Please provide patient with key chain box for these emergency medications    Class: E-Prescribe           ALLERGIES:    Allergies   Allergen Reactions     Peanut-Containing Drug Products Anaphylaxis     Nuts Rash     Tree nuts     Shellfish Allergy Rash     REVIEW OF SYSTEMS:  A comprehensive review of systems was performed and found to be negative except as described here or above.  SOCIAL HISTORY:   Social History     Socioeconomic History     Marital status: Single     Spouse name: Not on file     Number of children: Not on file     Years of education: Not on file     Highest education level: Not on file   Occupational History     Not on file   Tobacco Use     Smoking status: Never     Passive exposure: Never     Smokeless tobacco: Never   Vaping Use     Vaping status: Never Used   Substance and Sexual Activity     Alcohol use: Never     Drug use: Never     Sexual activity: Not on file   Other Topics Concern     Not on file   Social History Narrative    Valentin is attending the [employer] and studying [employer] engineering.      Social Determinants of Health     Financial Resource Strain: Not on file   Food Insecurity: Not on file   Transportation Needs: Not on file   Physical Activity: Not on file   Stress: Not on file   Social Connections: Not on file   Interpersonal Safety: Not At Risk (2/20/2023)    Humiliation, Afraid, Rape, and Kick questionnaire      Fear of Current or Ex-Partner: No      Emotionally Abused: No      Physically Abused: No      Sexually Abused: No   Housing Stability: Not on file     FAMILY MEDICAL HISTORY:   Family History   Problem Relation Age of Onset     Alport syndrome Mother      Kidney failure Maternal Grandfather         ESKD in his 40s     Alport syndrome Maternal Grandfather      Hearing Loss Maternal Grandfather      Cerebrovascular Disease Maternal Grandfather          Diet at age 50 due to aortic valve infection and stroke     Hematuria Maternal Aunt      Alport syndrome Maternal Aunt         Kidney transplant at age 39     Alport syndrome Maternal Great-Grandmother         ESKD in her 80s-90s, declined dialysis     Diabetes No family hx of      Glaucoma No family hx of      Macular Degeneration No family hx of      PHYSICAL EXAM:   /84 (BP Location: Right arm, Patient Position: Sitting, Cuff Size: Adult Regular)   Pulse 65   Temp 97.5  F (36.4  C) (Oral)   Wt 70.6 kg (155 lb 9.6 oz)   SpO2 98%   BMI 21.01 kg/m      GENERAL APPEARANCE: alert and no distress  EYES: nonicteric  NECK: supple, no adenopathy  RESP: lungs clear   Cor: RRR, no r/g/m  L Ext No swelling    SKIN: no facial rash  NEURO: mentation intact and speech normal  PSYCH: affect normal/bright   LABS:   Recent Results (from the past 672 hour(s))   Renal panel    Collection Time: 10/09/24  3:54 PM   Result Value Ref Range    Sodium 143 135 - 145 mmol/L    Potassium 3.5 3.4 - 5.3 mmol/L    Chloride 103 98 - 107 mmol/L    Carbon Dioxide (CO2) 31 (H) 22 - 29 mmol/L    Anion Gap 9 7 - 15 mmol/L    Glucose 87 70 - 99 mg/dL    Urea Nitrogen 11.0 6.0 - 20.0 mg/dL    Creatinine 1.00 0.67 - 1.17 mg/dL    GFR Estimate >90 >60 mL/min/1.73m2    Calcium 9.9 8.8 - 10.4 mg/dL    Albumin 4.4 3.5 - 5.2 g/dL    Phosphorus 3.8 2.5 - 4.5 mg/dL   CBC with platelets and differential    Collection Time: 10/09/24  3:54 PM   Result Value Ref Range    WBC Count 8.5 4.0 - 11.0 10e3/uL    RBC Count 5.28 4.40 - 5.90 10e6/uL    Hemoglobin 16.3 13.3 - 17.7 g/dL    Hematocrit 45.9 40.0 - 53.0 %    MCV 87 78 - 100 fL    MCH 30.9 26.5 - 33.0 pg    MCHC 35.5 31.5 - 36.5 g/dL    RDW 12.3 10.0 - 15.0 %    Platelet Count 175 150 - 450 10e3/uL    % Neutrophils 55 %    % Lymphocytes 32 %    % Monocytes 8 %    % Eosinophils 4 %    % Basophils 1 %    % Immature Granulocytes 0 %    NRBCs per 100 WBC 0 <1 /100    Absolute Neutrophils 4.6 1.6 - 8.3  10e3/uL    Absolute Lymphocytes 2.7 0.8 - 5.3 10e3/uL    Absolute Monocytes 0.7 0.0 - 1.3 10e3/uL    Absolute Eosinophils 0.3 0.0 - 0.7 10e3/uL    Absolute Basophils 0.1 0.0 - 0.2 10e3/uL    Absolute Immature Granulocytes 0.0 <=0.4 10e3/uL    Absolute NRBCs 0.0 10e3/uL   Protein  random urine    Collection Time: 10/09/24  4:05 PM   Result Value Ref Range    Total Protein Urine mg/dL 271.0   mg/dL    Total Protein Urine mg/mg Creat 2.80 (H) 0.00 - 0.20 mg/mg Cr    Creatinine Urine mg/dL 96.7 mg/dL     CMP  Recent Labs   Lab Test 10/09/24  1554 04/03/24  1129 02/28/24  1119 01/17/24  1600 05/31/23  1441 10/19/22  1657 10/19/22  1657    142 140 140 141  --  140   POTASSIUM 3.5 3.8 4.5 3.8 4.4   < > 4.1   CHLORIDE 103 102 101 102 102   < > 102   CO2 31* 30* 30* 30* 29   < > 33*   ANIONGAP 9 10 9 8 10   < > 5   GLC 87 93 102* 89 94   < > 90   BUN 11.0 10.7 16.0 12.9 9.5   < > 11   CR 1.00 0.98 0.93 0.94 0.84  --  0.85   GFRESTIMATED >90 >90 >90 >90 >90   < >  --    SAMANTHA 9.9 9.5 9.5 9.1 9.6  --  9.4   PHOS 3.8  --  4.2  --  3.3  --  4.7*   PROTTOTAL  --  6.9 6.6 6.3*  --   --   --    ALBUMIN 4.4 4.4 4.4 4.3 4.3   < > 3.8   BILITOTAL  --  0.7 1.0 0.6  --   --   --    ALKPHOS  --  69 65 63*  --   --   --    AST  --  24 25 24  --   --   --    ALT  --  10 15 11  --   --   --     < > = values in this interval not displayed.     CBC  Recent Labs   Lab Test 10/09/24  1554 04/03/24  1129 01/17/24  1600 05/31/23  1441   HGB 16.3 16.1 14.5 15.0   WBC 8.5 7.2 6.9  --    RBC 5.28 5.31 4.87  --    HCT 45.9 45.6 41.1  --    MCV 87 86 84  --     178 182  --      INRNo lab results found.  ABGNo lab results found.   URINE STUDIES  Recent Labs   Lab Test 04/03/24  1132 01/17/24  1554 05/31/23  1441 10/19/22  1657   APPEARANCE Clear Clear Clear Clear   URINEGLC 1000* Negative Negative Negative   URINEBILI Negative Negative Negative Negative   URINEKETONE Negative Negative Negative Negative   SG 1.030 1.008 1.016 1.015   UBLD  Large* Large* Large* Large*   URINEPH 6.5 7.0 7.5* 7.5*   PROTEIN 300* 100* 200* 100*   NITRITE Negative Negative Negative Negative   LEUKEST Negative Negative Negative Negative   RBCU >182* 68* 74* 87*   WBCU 2 <1 1 4     Recent Labs   Lab Test 10/19/22  1657   UTPG 1.30*      Latest Reference Range & Units 05/31/23 14:41 01/17/24 15:54 04/03/24 11:32 10/09/24 16:05   Total Protein Urine mg/mg Creat 0.00 - 0.20 mg/mg Cr 1.91 (H) 2.19 (H) 2.37 (H) 2.80 (H)       ASSESSMENT AND PLAN:   Mr Gaytan is seen for X linked Alport syndrome.   He is doing well and reports no acute complaints.  He is tolerating the SGLT2i well without AE.    No change in losartan dose  His proteinuria remains stable, but has not improved -> increase dapa to 10 mg daily.    RTC in 6 m..    I spent a total of 23 minutes on the date of this encounter in reviewing the medical records, face-to-face evaluation and physical exam, review of labs, counseling, orders, care coordination and documentation  Prasanna Aguilar MD  Division of Renal Disease and Hypertension  October 10, 2024        Again, thank you for allowing me to participate in the care of your patient.      Sincerely,    Prasanna Aguilar MD

## 2024-10-10 NOTE — PROGRESS NOTES
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  Was previously followed by Dr Jaz Metz (last seen in May 2023).  From Dr Metz's notes, Valentin has had proteinuria around 1.3-1.9 g/d.  He has however not been able to tolerate even small doses of ACEi or ARB due to dizziness.  Consideration/discussion was made about adding an SGLT2i.  Feels generally well.  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  Denies L Ext swelling. Urine is not foamy.  Anxiety and ADHD on therapy.  Followed by therapist.   Happy at school.  Sophomore [employer].    Audiology evaluation on 23  indicated normal hearing bilaterally.   Ophthalmology evaluations in  and 2023 were for dermatitis, blepharitis -> a full ophthalmo examination and evaluation for Alport s. Was not performed.(?).   denies any ocular/visual problems.    Mom has hearing loss. (Started in late 's)   Mat GF had Alport -  from GB dis.   Aunt had ESKD in late 30\"s -> kidney transplant -> failed -> back on dialysis.    No Siblings    JANE participant.  Upcoming study visit.    2024  Started farxiga a month ago.   Tolerating well.  No GI or  spm.  No l. ext swelling  No change in hearing.  Had 2 previous audiology tests neither had abnormalities.  ROS is otherwise unremarkable.     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  BP at home is lower than in clinic today.  Tolerating SGLT2 well.    Enrolled in the R3R study, but had to " withdraw bec of time commitment and conflict with work schedule  ROS neg for Pulm, CV, GI,  spms  PAST MEDICAL HISTORY:  Past Medical History:   Diagnosis Date    Hematuria     Proteinuria     X-linked Alport syndrome     Mother genetic testing: COL4A5 splice site variant, COL4A5 c.891+1G>A     PAST SURGICAL HISTORY:  No past surgical history on file.  MEDICATIONS: reviewed on 24  Prescription Medications as of 10/10/2024         Rx Number Disp Refills Start End Last Dispensed Date Next Fill Date Owning Pharmacy    amphetamine-dextroamphetamine (ADDERALL XR) 20 MG 24 hr capsule  -- -- 2024 --       Sig: Take 20 mg by mouth daily as needed    Class: Historical    Route: Oral    azelastine (ASTELIN) 0.1 % nasal spray  30 mL 3 2023 --   [address]    Sig: Spray 1 spray into both nostrils 2 times daily    Class: E-Prescribe    Route: Both Nostrils    Class: E-Prescribe    cetirizine (ZYRTEC) 10 MG tablet  10 tablet 1 2023 --   [facility] [address]    Sig: Emergency set: if severe allergic reaction take immediately 100mg Prednisone (2x50mg) and 2 Tabl Cetirizine 10mg and then write precise 12h retrospective diary. If less severe reaction take only the 2 Tabl Cetirizine. Please provide patient with key chain box for these emergency medications.   TAkes daily as needed for seasonal allergies (spring and fall)    Class: E-Prescribe    clobetasol propionate (CLOBEX) 0.05 % external shampoo  118 mL 3 2023 --   [address]    Six weekly for hair wash    Class: E-Prescribe    dapagliflozin (FARXIGA) 5 MG TABS tablet  30 tablet 11 2024 --   [address]    Sig: Take 1 tablet (5 mg) by mouth daily    Class: E-Prescribe    Route: Oral    EPINEPHrine (ANY BX GENERIC EQUIV) 0.3 MG/0.3ML injection  2-pack  2 each 1 7/12/2023 --   [facility] [address]    Sig: Inject 0.3 mLs (0.3 mg) into the muscle as needed for anaphylaxis May repeat one time in 5-15 minutes if response to initial dose is inadequate.    Class: E-Prescribe    Route: Intramuscular    ketoconazole (NIZORAL) 2 % external shampoo  120 mL 3 10/2/2023 --   [address]    Sig: Apply topically twice a week 2 times weekly    Class: E-Prescribe    Route: Topical    losartan (COZAAR) 25 MG tablet  45 tablet 3 2/12/2024 --   [place] [address]    Sig: Take 0.5 tablets (12.5 mg) by mouth daily    Class: E-Prescribe    Route: Oral    mometasone (ELOCON) 0.1 % external solution  60 mL 3 10/9/2023 --   [place] [address]    Sig: Apply topically twice a week    Class: E-Prescribe    Route: Topical    predniSONE (DELTASONE) 50 MG tablet  2 tablet 4 5/31/2024 --   [address]    Sig: Emergency set: if severe allergic reaction take immediately 100mg Prednisone (2x50mg) and 2 Tabl Cetirizine 10mg and then write precise 12h retrospective diary. If less severe reaction take only the 2 Tabl Cetirizine. Please provide patient with key chain box for these emergency medications    Class: E-Prescribe    predniSONE (DELTASONE) 50 MG tablet  2 tablet 3 7/12/2023 --   [facility] [place], [place] - [address]    Sig: Emergency set: if severe allergic reaction take immediately 100mg Prednisone (2x50mg) and 2 Tabl Cetirizine 10mg and then write precise 12h retrospective diary. If less severe reaction take only the 2 Tabl Cetirizine. Please provide patient with key chain box for these emergency medications    Class: E-Prescribe           ALLERGIES:    Allergies   Allergen Reactions    Peanut-Containing Drug Products  Anaphylaxis    Nuts Rash     Tree nuts    Shellfish Allergy Rash     REVIEW OF SYSTEMS:  A comprehensive review of systems was performed and found to be negative except as described here or above.  SOCIAL HISTORY:   Social History     Socioeconomic History    Marital status: Single     Spouse name: Not on file    Number of children: Not on file    Years of education: Not on file    Highest education level: Not on file   Occupational History    Not on file   Tobacco Use    Smoking status: Never     Passive exposure: Never    Smokeless tobacco: Never   Vaping Use    Vaping status: Never Used   Substance and Sexual Activity    Alcohol use: Never    Drug use: Never    Sexual activity: Not on file   Other Topics Concern    Not on file   Social History Narrative    Valentin is attending the [employer] and studying [employer] engineering.      Social Determinants of Health     Financial Resource Strain: Not on file   Food Insecurity: Not on file   Transportation Needs: Not on file   Physical Activity: Not on file   Stress: Not on file   Social Connections: Not on file   Interpersonal Safety: Not At Risk (2/20/2023)    Humiliation, Afraid, Rape, and Kick questionnaire     Fear of Current or Ex-Partner: No     Emotionally Abused: No     Physically Abused: No     Sexually Abused: No   Housing Stability: Not on file     FAMILY MEDICAL HISTORY:   Family History   Problem Relation Age of Onset    Alport syndrome Mother     Kidney failure Maternal Grandfather         ESKD in his 40s    Alport syndrome Maternal Grandfather     Hearing Loss Maternal Grandfather     Cerebrovascular Disease Maternal Grandfather         Diet at age 50 due to aortic valve infection and stroke    Hematuria Maternal Aunt     Alport syndrome Maternal Aunt         Kidney transplant at age 39    Alport syndrome Maternal Great-Grandmother         ESKD in her 80s-90s, declined dialysis    Diabetes No family hx of     Glaucoma No family hx of     Macular Degeneration  No family hx of      PHYSICAL EXAM:   /84 (BP Location: Right arm, Patient Position: Sitting, Cuff Size: Adult Regular)   Pulse 65   Temp 97.5  F (36.4  C) (Oral)   Wt 70.6 kg (155 lb 9.6 oz)   SpO2 98%   BMI 21.01 kg/m      GENERAL APPEARANCE: alert and no distress  EYES: nonicteric  NECK: supple, no adenopathy  RESP: lungs clear   Cor: RRR, no r/g/m  L Ext No swelling    SKIN: no facial rash  NEURO: mentation intact and speech normal  PSYCH: affect normal/bright   LABS:   Recent Results (from the past 672 hour(s))   Renal panel    Collection Time: 10/09/24  3:54 PM   Result Value Ref Range    Sodium 143 135 - 145 mmol/L    Potassium 3.5 3.4 - 5.3 mmol/L    Chloride 103 98 - 107 mmol/L    Carbon Dioxide (CO2) 31 (H) 22 - 29 mmol/L    Anion Gap 9 7 - 15 mmol/L    Glucose 87 70 - 99 mg/dL    Urea Nitrogen 11.0 6.0 - 20.0 mg/dL    Creatinine 1.00 0.67 - 1.17 mg/dL    GFR Estimate >90 >60 mL/min/1.73m2    Calcium 9.9 8.8 - 10.4 mg/dL    Albumin 4.4 3.5 - 5.2 g/dL    Phosphorus 3.8 2.5 - 4.5 mg/dL   CBC with platelets and differential    Collection Time: 10/09/24  3:54 PM   Result Value Ref Range    WBC Count 8.5 4.0 - 11.0 10e3/uL    RBC Count 5.28 4.40 - 5.90 10e6/uL    Hemoglobin 16.3 13.3 - 17.7 g/dL    Hematocrit 45.9 40.0 - 53.0 %    MCV 87 78 - 100 fL    MCH 30.9 26.5 - 33.0 pg    MCHC 35.5 31.5 - 36.5 g/dL    RDW 12.3 10.0 - 15.0 %    Platelet Count 175 150 - 450 10e3/uL    % Neutrophils 55 %    % Lymphocytes 32 %    % Monocytes 8 %    % Eosinophils 4 %    % Basophils 1 %    % Immature Granulocytes 0 %    NRBCs per 100 WBC 0 <1 /100    Absolute Neutrophils 4.6 1.6 - 8.3 10e3/uL    Absolute Lymphocytes 2.7 0.8 - 5.3 10e3/uL    Absolute Monocytes 0.7 0.0 - 1.3 10e3/uL    Absolute Eosinophils 0.3 0.0 - 0.7 10e3/uL    Absolute Basophils 0.1 0.0 - 0.2 10e3/uL    Absolute Immature Granulocytes 0.0 <=0.4 10e3/uL    Absolute NRBCs 0.0 10e3/uL   Protein  random urine    Collection Time: 10/09/24  4:05 PM    Result Value Ref Range    Total Protein Urine mg/dL 271.0   mg/dL    Total Protein Urine mg/mg Creat 2.80 (H) 0.00 - 0.20 mg/mg Cr    Creatinine Urine mg/dL 96.7 mg/dL     CMP  Recent Labs   Lab Test 10/09/24  1554 04/03/24  1129 02/28/24  1119 01/17/24 1600 05/31/23  1441 10/19/22  1657 10/19/22  1657    142 140 140 141  --  140   POTASSIUM 3.5 3.8 4.5 3.8 4.4   < > 4.1   CHLORIDE 103 102 101 102 102   < > 102   CO2 31* 30* 30* 30* 29   < > 33*   ANIONGAP 9 10 9 8 10   < > 5   GLC 87 93 102* 89 94   < > 90   BUN 11.0 10.7 16.0 12.9 9.5   < > 11   CR 1.00 0.98 0.93 0.94 0.84  --  0.85   GFRESTIMATED >90 >90 >90 >90 >90   < >  --    SAMANTHA 9.9 9.5 9.5 9.1 9.6  --  9.4   PHOS 3.8  --  4.2  --  3.3  --  4.7*   PROTTOTAL  --  6.9 6.6 6.3*  --   --   --    ALBUMIN 4.4 4.4 4.4 4.3 4.3   < > 3.8   BILITOTAL  --  0.7 1.0 0.6  --   --   --    ALKPHOS  --  69 65 63*  --   --   --    AST  --  24 25 24  --   --   --    ALT  --  10 15 11  --   --   --     < > = values in this interval not displayed.     CBC  Recent Labs   Lab Test 10/09/24  1554 04/03/24  1129 01/17/24 1600 05/31/23  1441   HGB 16.3 16.1 14.5 15.0   WBC 8.5 7.2 6.9  --    RBC 5.28 5.31 4.87  --    HCT 45.9 45.6 41.1  --    MCV 87 86 84  --     178 182  --      INRNo lab results found.  ABGNo lab results found.   URINE STUDIES  Recent Labs   Lab Test 04/03/24  1132 01/17/24  1554 05/31/23  1441 10/19/22  1657   APPEARANCE Clear Clear Clear Clear   URINEGLC 1000* Negative Negative Negative   URINEBILI Negative Negative Negative Negative   URINEKETONE Negative Negative Negative Negative   SG 1.030 1.008 1.016 1.015   UBLD Large* Large* Large* Large*   URINEPH 6.5 7.0 7.5* 7.5*   PROTEIN 300* 100* 200* 100*   NITRITE Negative Negative Negative Negative   LEUKEST Negative Negative Negative Negative   RBCU >182* 68* 74* 87*   WBCU 2 <1 1 4     Recent Labs   Lab Test 10/19/22  1657   UTPG 1.30*      Latest Reference Range & Units 05/31/23 14:41 01/17/24  15:54 04/03/24 11:32 10/09/24 16:05   Total Protein Urine mg/mg Creat 0.00 - 0.20 mg/mg Cr 1.91 (H) 2.19 (H) 2.37 (H) 2.80 (H)       ASSESSMENT AND PLAN:   Mr Gaytan is seen for X linked Alport syndrome.   He is doing well and reports no acute complaints.  He is tolerating the SGLT2i well without AE.    No change in losartan dose  His proteinuria remains stable, but has not improved -> increase dapa to 10 mg daily.    RTC in 6 m..    I spent a total of 23 minutes on the date of this encounter in reviewing the medical records, face-to-face evaluation and physical exam, review of labs, counseling, orders, care coordination and documentation  Prasanna Aguilar MD  Division of Renal Disease and Hypertension  October 10, 2024

## 2024-10-10 NOTE — NURSING NOTE
Chief Complaint   Patient presents with    RECHECK     RETURN GLOMERULAR - 6 month follow up, scheduled in pod with pt, records in epic         /84 (BP Location: Right arm, Patient Position: Sitting, Cuff Size: Adult Regular)   Pulse 65   Temp 97.5  F (36.4  C) (Oral)   Wt 70.6 kg (155 lb 9.6 oz)   SpO2 98%   BMI 21.01 kg/m      Hunter Armando CMA on 10/10/2024 at 12:44 PM

## 2024-10-16 RX ORDER — LOSARTAN POTASSIUM 25 MG/1
12.5 TABLET ORAL DAILY
Qty: 45 TABLET | Refills: 3 | Status: SHIPPED | OUTPATIENT
Start: 2024-10-16

## 2024-10-16 NOTE — PROGRESS NOTES
Losartan refilled per T.O. to cover until next follow up appt.  Faye Trevino RN, BSN, PHN  [facility] Nephrology Nurse Navigator  [phone]

## 2024-12-16 ENCOUNTER — PATIENT OUTREACH (OUTPATIENT)
Dept: NEPHROLOGY | Facility: CLINIC | Age: 19
End: 2024-12-16
Payer: COMMERCIAL

## 2024-12-16 NOTE — PROGRESS NOTES
Updated from patient via [facility] that May will be during finals week.  Voicemail of Genetics appt schedule availability, March or May or another provider.  Faye Trevino RN, BSN, PHN   Care Coordinator  [phone]

## 2024-12-16 NOTE — PROGRESS NOTES
Reached out to patient to support rescheduling Genetic appt for May genetics clinic.  Faye Trevino RN, BSN, PHN  [facility]  [phone]

## 2025-03-04 ENCOUNTER — PATIENT OUTREACH (OUTPATIENT)
Dept: NEPHROLOGY | Facility: CLINIC | Age: 20
End: 2025-03-04
Payer: COMMERCIAL

## 2025-03-05 NOTE — PROGRESS NOTES
Reached out to patient to support rescheduling April 17th closed clinic.  Faye Trevino RN, BSN, PHN   Care Coordinator  [phone]

## 2025-03-09 ENCOUNTER — HEALTH MAINTENANCE LETTER (OUTPATIENT)
Age: 20
End: 2025-03-09

## 2025-05-01 ENCOUNTER — LAB (OUTPATIENT)
Dept: LAB | Facility: CLINIC | Age: 20
End: 2025-05-01
Payer: COMMERCIAL

## 2025-05-01 DIAGNOSIS — Q87.81 X-LINKED ALPORT SYNDROME: ICD-10-CM

## 2025-05-01 LAB
ALBUMIN MFR UR ELPH: 132 MG/DL
ALBUMIN SERPL BCG-MCNC: 4 G/DL (ref 3.5–5.2)
ALBUMIN UR-MCNC: 100 MG/DL
ANION GAP SERPL CALCULATED.3IONS-SCNC: 7 MMOL/L (ref 7–15)
APPEARANCE UR: CLEAR
BILIRUB UR QL STRIP: NEGATIVE
BUN SERPL-MCNC: 12.5 MG/DL (ref 6–20)
CALCIUM SERPL-MCNC: 8.8 MG/DL (ref 8.8–10.4)
CHLORIDE SERPL-SCNC: 102 MMOL/L (ref 98–107)
COLOR UR AUTO: ABNORMAL
CREAT SERPL-MCNC: 1.13 MG/DL (ref 0.67–1.17)
CREAT UR-MCNC: 85.1 MG/DL
EGFRCR SERPLBLD CKD-EPI 2021: >90 ML/MIN/1.73M2
ERYTHROCYTE [DISTWIDTH] IN BLOOD BY AUTOMATED COUNT: 12.3 % (ref 10–15)
GLUCOSE SERPL-MCNC: 80 MG/DL (ref 70–99)
GLUCOSE UR STRIP-MCNC: >=1000 MG/DL
HCO3 SERPL-SCNC: 31 MMOL/L (ref 22–29)
HCT VFR BLD AUTO: 44 % (ref 40–53)
HGB BLD-MCNC: 15.4 G/DL (ref 13.3–17.7)
HGB UR QL STRIP: ABNORMAL
KETONES UR STRIP-MCNC: NEGATIVE MG/DL
LEUKOCYTE ESTERASE UR QL STRIP: NEGATIVE
MCH RBC QN AUTO: 30.1 PG (ref 26.5–33)
MCHC RBC AUTO-ENTMCNC: 35 G/DL (ref 31.5–36.5)
MCV RBC AUTO: 86 FL (ref 78–100)
NITRATE UR QL: NEGATIVE
PH UR STRIP: 6.5 [PH] (ref 5–7)
PHOSPHATE SERPL-MCNC: 3.5 MG/DL (ref 2.5–4.5)
PLATELET # BLD AUTO: 161 10E3/UL (ref 150–450)
POTASSIUM SERPL-SCNC: 4 MMOL/L (ref 3.4–5.3)
PROT/CREAT 24H UR: 1.55 MG/MG CR (ref 0–0.2)
RBC # BLD AUTO: 5.11 10E6/UL (ref 4.4–5.9)
RBC URINE: 44 /HPF
SODIUM SERPL-SCNC: 140 MMOL/L (ref 135–145)
SP GR UR STRIP: 1.02 (ref 1–1.03)
UROBILINOGEN UR STRIP-MCNC: NORMAL MG/DL
WBC # BLD AUTO: 7.5 10E3/UL (ref 4–11)
WBC URINE: 1 /HPF

## 2025-05-02 ENCOUNTER — OFFICE VISIT (OUTPATIENT)
Dept: NEPHROLOGY | Facility: CLINIC | Age: 20
End: 2025-05-02
Attending: INTERNAL MEDICINE
Payer: COMMERCIAL

## 2025-05-02 VITALS
HEART RATE: 60 BPM | SYSTOLIC BLOOD PRESSURE: 125 MMHG | BODY MASS INDEX: 21.06 KG/M2 | TEMPERATURE: 97.4 F | DIASTOLIC BLOOD PRESSURE: 81 MMHG | OXYGEN SATURATION: 99 % | WEIGHT: 156 LBS

## 2025-05-02 DIAGNOSIS — R80.1 PERSISTENT PROTEINURIA, UNSPECIFIED: ICD-10-CM

## 2025-05-02 DIAGNOSIS — Q87.81 X-LINKED ALPORT SYNDROME: ICD-10-CM

## 2025-05-02 PROCEDURE — 99213 OFFICE O/P EST LOW 20 MIN: CPT | Performed by: INTERNAL MEDICINE

## 2025-05-02 ASSESSMENT — PAIN SCALES - GENERAL: PAINLEVEL_OUTOF10: NO PAIN (0)

## 2025-05-02 NOTE — LETTER
"2025       RE: Valentin Gaytan  [address] [facility] [address]     Dear Colleague,    Thank you for referring your patient, Valentin Gaytan, to the [facility] NEPHROLOGY [facility]INIC [place] at [facility]. Please see a copy of my visit note below.    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  Was previously followed by Dr Jaz Metz (last seen in May 2023).  From Dr Metz's notes, Valentin has had proteinuria around 1.3-1.9 g/d.  He has however not been able to tolerate even small doses of ACEi or ARB due to dizziness.  Consideration/discussion was made about adding an SGLT2i.  Feels generally well.  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  Denies L Ext swelling. Urine is not foamy.  Anxiety and ADHD on therapy.  Followed by therapist.   Happy at school.  Sophomore computer engineering.    Audiology evaluation on 23  indicated normal hearing bilaterally.   Ophthalmology evaluations in  and 2023 were for dermatitis, blepharitis -> a full ophthalmo examination and evaluation for Alport s. Was not performed.(?).   denies any ocular/visual problems.    Mom has hearing loss. (Started in late s)   Mat GF had Alport -  from GB dis.   Aunt had ESKD in late \"s -> kidney transplant -> failed -> back on dialysis.    No Siblings    JANE participant.  Upcoming study visit.    2024  Started farxiga a month ago.   Tolerating well.  No GI or  spm.  No l. ext swelling  No change in hearing.  Had 2 previous " "audiology tests neither had abnormalities.  ROS is otherwise unremarkable.     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  BP at home is lower than in clinic today.  Tolerating SGLT2 well.    Enrolled in the R3R study, but had to withdraw bec of time commitment and conflict with work schedule  ROS neg for Pulm, CV, GI,  spms    May 2, 2025  Mr Gaytan is here for routine follow up.  He feels generally well.  He reports however having symptoms of orthostatic lightheadedness, sometimes fairly severe.  This is despite standing up \" in stages\".  On a couple of occasions, he has had to hold on to something.   He otherwise reports no L ext swelling.  No change in hearing.         PAST MEDICAL HISTORY:  Past Medical History:   Diagnosis Date     Hematuria      Proteinuria      X-linked Alport syndrome     Mother genetic testing: COL4A5 splice site variant, COL4A5 c.891+1G>A     PAST SURGICAL HISTORY:  No past surgical history on file.  MEDICATIONS: reviewed on 5/2/25    Losartan 12.5 mg daily  Current Outpatient Medications   Medication Sig Dispense Refill     amphetamine-dextroamphetamine (ADDERALL) 10 MG tablet TAKE 1/2 TO 2 TABLETS BY MOUTH TWICE DAILY (4 HOURS APART) AS NEEDED FOR ADHD. *MAX 2 TABS PER DAY*       cetirizine (ZYRTEC) 10 MG tablet Emergency set: if severe allergic reaction take immediately 100mg Prednisone (2x50mg) and 2 Tabl Cetirizine 10mg and then write precise 12h retrospective diary. If less severe reaction take only the 2 Tabl Cetirizine. Please provide patient with key chain box for these emergency medications 10 tablet 1     dapagliflozin (FARXIGA) 10 MG TABS tablet Take 1 tablet (10 mg) by mouth daily. 90 tablet 3     diphenhydrAMINE (BENADRYL ALLERGY) 25 MG tablet Take by mouth as needed.       EPINEPHrine (ANY BX GENERIC EQUIV) 0.3 MG/0.3ML injection 2-pack Inject 0.3 mLs (0.3 mg) into the muscle as needed for anaphylaxis May repeat one time " in 5-15 minutes if response to initial dose is inadequate. 2 each 1     predniSONE (DELTASONE) 50 MG tablet Emergency set: if severe allergic reaction take immediately 100mg Prednisone (2x50mg) and 2 Tabl Cetirizine 10mg and then write precise 12h retrospective diary. If less severe reaction take only the 2 Tabl Cetirizine. Please provide patient with key chain box for these emergency medications 2 tablet 3     amphetamine-dextroamphetamine (ADDERALL XR) 20 MG 24 hr capsule Take 20 mg by mouth daily as needed (Patient not taking: Reported on 5/2/2025)       azelastine (ASTELIN) 0.1 % nasal spray Spray 1 spray into both nostrils 2 times daily (Patient not taking: Reported on 5/2/2025) 30 mL 3     cetirizine (ZYRTEC) 10 MG tablet Emergency set: if severe allergic reaction take immediately 100mg Prednisone (2x50mg) and 2 Tabl Cetirizine 10mg and then write precise 12h retrospective diary. If less severe reaction take only the 2 Tabl Cetirizine. Please provide patient with key chain box for these emergency medications (Patient not taking: Reported on 5/2/2025) 30 tablet 1     clobetasol propionate (CLOBEX) 0.05 % external shampoo 2x weekly for hair wash (Patient not taking: Reported on 5/2/2025) 118 mL 3     ketoconazole (NIZORAL) 2 % external shampoo Apply topically twice a week 2 times weekly (Patient not taking: Reported on 5/2/2025) 120 mL 3     mometasone (ELOCON) 0.1 % external solution Apply topically twice a week (Patient not taking: Reported on 5/2/2025) 60 mL 3     predniSONE (DELTASONE) 50 MG tablet Emergency set: if severe allergic reaction take immediately 100mg Prednisone (2x50mg) and 2 Tabl Cetirizine 10mg and then write precise 12h retrospective diary. If less severe reaction take only the 2 Tabl Cetirizine. Please provide patient with key chain box for these emergency medications (Patient not taking: Reported on 5/2/2025) 2 tablet 4             ALLERGIES:    Allergies   Allergen Reactions      Peanut-Containing Drug Products Anaphylaxis     Nuts Rash     Tree nuts     Shellfish Allergy Rash     REVIEW OF SYSTEMS:  A comprehensive review of systems was performed and found to be negative except as described here or above.  SOCIAL HISTORY:       FAMILY MEDICAL HISTORY:   Family History   Problem Relation Age of Onset     Alport syndrome Mother      Kidney failure Maternal Grandfather         ESKD in his 40s     Alport syndrome Maternal Grandfather      Hearing Loss Maternal Grandfather      Cerebrovascular Disease Maternal Grandfather         Diet at age 50 due to aortic valve infection and stroke     Hematuria Maternal Aunt      Alport syndrome Maternal Aunt         Kidney transplant at age 39     Alport syndrome Maternal Great-Grandmother         ESKD in her 80s-90s, declined dialysis     Diabetes No family hx of      Glaucoma No family hx of      Macular Degeneration No family hx of      PHYSICAL EXAM:   /81   Pulse 60   Temp 97.4  F (36.3  C) (Oral)   Wt 70.8 kg (156 lb)   SpO2 99%   BMI 21.06 kg/m      GENERAL APPEARANCE: alert and no distress  EYES: nonicteric  NECK: supple, no adenopathy  RESP: lungs clear   Cor: RRR, no r/g/m  L Ext No swelling  SKIN: no facial rash  NEURO: mentation intact and speech normal  PSYCH: affect normal/bright   LABS:   Recent Results (from the past 4 weeks)   UA with Microscopic    Collection Time: 05/01/25  3:16 PM   Result Value Ref Range    Color Urine Light Yellow Colorless, Straw, Light Yellow, Yellow    Appearance Urine Clear Clear    Glucose Urine >=1000 (A) Negative mg/dL    Bilirubin Urine Negative Negative    Ketones Urine Negative Negative mg/dL    Specific Gravity Urine 1.021 1.003 - 1.035    Blood Urine Large (A) Negative    pH Urine 6.5 5.0 - 7.0    Protein Albumin Urine 100 (A) Negative mg/dL    Urobilinogen Urine Normal Normal mg/dL    Nitrite Urine Negative Negative    Leukocyte Esterase Urine Negative Negative    RBC Urine 44 (H) <=2 /HPF     WBC Urine 1 <=5 /HPF   Protein  random urine    Collection Time: 05/01/25  3:16 PM   Result Value Ref Range    Total Protein Urine mg/dL 132.0   mg/dL    Total Protein Urine mg/mg Creat 1.55 (H) 0.00 - 0.20 mg/mg Cr    Creatinine Urine mg/dL 85.1 mg/dL   Renal panel    Collection Time: 05/01/25  3:18 PM   Result Value Ref Range    Sodium 140 135 - 145 mmol/L    Potassium 4.0 3.4 - 5.3 mmol/L    Chloride 102 98 - 107 mmol/L    Carbon Dioxide (CO2) 31 (H) 22 - 29 mmol/L    Anion Gap 7 7 - 15 mmol/L    Glucose 80 70 - 99 mg/dL    Urea Nitrogen 12.5 6.0 - 20.0 mg/dL    Creatinine 1.13 0.67 - 1.17 mg/dL    GFR Estimate >90 >60 mL/min/1.73m2    Calcium 8.8 8.8 - 10.4 mg/dL    Albumin 4.0 3.5 - 5.2 g/dL    Phosphorus 3.5 2.5 - 4.5 mg/dL   CBC with platelets    Collection Time: 05/01/25  3:18 PM   Result Value Ref Range    WBC Count 7.5 4.0 - 11.0 10e3/uL    RBC Count 5.11 4.40 - 5.90 10e6/uL    Hemoglobin 15.4 13.3 - 17.7 g/dL    Hematocrit 44.0 40.0 - 53.0 %    MCV 86 78 - 100 fL    MCH 30.1 26.5 - 33.0 pg    MCHC 35.0 31.5 - 36.5 g/dL    RDW 12.3 10.0 - 15.0 %    Platelet Count 161 150 - 450 10e3/uL     CMP  Recent Labs   Lab Test 05/01/25  1518 10/09/24  1554 04/03/24  1129 02/28/24  1119 01/17/24  1600 05/31/23  1441    143 142 140 140 141   POTASSIUM 4.0 3.5 3.8 4.5 3.8 4.4   CHLORIDE 102 103 102 101 102 102   CO2 31* 31* 30* 30* 30* 29   ANIONGAP 7 9 10 9 8 10   GLC 80 87 93 102* 89 94   BUN 12.5 11.0 10.7 16.0 12.9 9.5   CR 1.13 1.00 0.98 0.93 0.94 0.84   GFRESTIMATED >90 >90 >90 >90 >90 >90   SAMANTHA 8.8 9.9 9.5 9.5 9.1 9.6   PHOS 3.5 3.8  --  4.2  --  3.3   PROTTOTAL  --   --  6.9 6.6 6.3*  --    ALBUMIN 4.0 4.4 4.4 4.4 4.3 4.3   BILITOTAL  --   --  0.7 1.0 0.6  --    ALKPHOS  --   --  69 65 63*  --    AST  --   --  24 25 24  --    ALT  --   --  10 15 11  --      CBC  Recent Labs   Lab Test 05/01/25  1518 10/09/24  1554 04/03/24  1129 01/17/24  1600   HGB 15.4 16.3 16.1 14.5   WBC 7.5 8.5 7.2 6.9   RBC 5.11 5.28  5.31 4.87   HCT 44.0 45.9 45.6 41.1   MCV 86 87 86 84    175 178 182     URINE STUDIES  Recent Labs   Lab Test 05/01/25  1516 04/03/24  1132 01/17/24  1554 05/31/23  1441   APPEARANCE Clear Clear Clear Clear   URINEGLC >=1000* 1000* Negative Negative   URINEBILI Negative Negative Negative Negative   URINEKETONE Negative Negative Negative Negative   SG 1.021 1.030 1.008 1.016   UBLD Large* Large* Large* Large*   URINEPH 6.5 6.5 7.0 7.5*   PROTEIN 100* 300* 100* 200*   NITRITE Negative Negative Negative Negative   LEUKEST Negative Negative Negative Negative   RBCU 44* >182* 68* 74*   WBCU 1 2 <1 1      Latest Reference Range & Units 05/31/23 14:41 01/17/24 15:54 04/03/24 11:32 10/09/24 16:05 05/01/25 15:16   Total Protein Urine mg/mg Creat 0.00 - 0.20 mg/mg Cr 1.91 (H) 2.19 (H) 2.37 (H) 2.80 (H) 1.55 (H)   (H): Data is abnormally high        ASSESSMENT AND PLAN:   Mr Gaytan is seen for X linked Alport syndrome.   He is doing well and reports no acute complaints.  He is tolerating the SGLT2i well without AE.    He does report episodes of symptomatic orthostatic hypotension -   We discussed and agreed to stop the very small dose of losartan.  If he continues to have these events, we would decrease the dose of dapagliglozin as well   RTC in 6 m..    Prasanna Aguilar MD  Division of Renal Disease and Hypertension  May 2, 2025        Again, thank you for allowing me to participate in the care of your patient.      Sincerely,    Prasanna Aguilar MD

## 2025-05-02 NOTE — NURSING NOTE
Chief Complaint   Patient presents with    RECHECK     RETURN KIDNEY GENETICS           Blood pressure 125/81, pulse 60, temperature 97.4  F (36.3  C), temperature source Oral, weight 70.8 kg (156 lb), SpO2 99%.      Magda Arshad

## 2025-05-04 NOTE — PROGRESS NOTES
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  Was previously followed by Dr Jaz Metz (last seen in May 2023).  From Dr Metz's notes, Valentin has had proteinuria around 1.3-1.9 g/d.  He has however not been able to tolerate even small doses of ACEi or ARB due to dizziness.  Consideration/discussion was made about adding an SGLT2i.  Feels generally well.  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  Denies L Ext swelling. Urine is not foamy.  Anxiety and ADHD on therapy.  Followed by therapist.   Happy at school.  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    Audiology evaluation on 23  indicated normal hearing bilaterally.   Ophthalmology evaluations in  and 2023 were for dermatitis, blepharitis -> a full ophthalmo examination and evaluation for Alport s. Was not performed.(?).   denies any ocular/visual problems.    Mom has hearing loss. (Started in late s)   Mat GF had Alport -  from GB dis.   Aunt had ESKD in late 30\"s -> kidney transplant -> failed -> back on dialysis.    No Siblings    JANE participant.  Upcoming study visit.    2024  Started farxiga a month ago.   Tolerating well.  No GI or  spm.  No l. ext swelling  No change in hearing.  Had 2 previous audiology tests neither had abnormalities.  ROS is otherwise unremarkable.     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  BP at home is lower than in clinic today.  Tolerating SGLT2 well.    Enrolled in the R3R study, but had to withdraw bec of time commitment " "and conflict with work schedule  ROS neg for Pulm, CV, GI,  spms    May 2, 2025  Mr Gaytan is here for routine follow up.  He feels generally well.  He reports however having symptoms of orthostatic lightheadedness, sometimes fairly severe.  This is despite standing up \" in stages\".  On a couple of occasions, he has had to hold on to something.   He otherwise reports no L ext swelling.  No change in hearing.         PAST MEDICAL HISTORY:  Past Medical History:   Diagnosis Date    Hematuria     Proteinuria     X-linked Alport syndrome     Mother genetic testing: COL4A5 splice site variant, COL4A5 c.891+1G>A     PAST SURGICAL HISTORY:  No past surgical history on file.  MEDICATIONS: reviewed on 5/2/25    Losartan 12.5 mg daily  Current Outpatient Medications   Medication Sig Dispense Refill    amphetamine-dextroamphetamine (ADDERALL) 10 MG tablet TAKE 1/2 TO 2 TABLETS BY MOUTH TWICE DAILY (4 HOURS APART) AS NEEDED FOR ADHD. *MAX 2 TABS PER DAY*      cetirizine (ZYRTEC) 10 MG tablet Emergency set: if severe allergic reaction take immediately 100mg Prednisone (2x50mg) and 2 Tabl Cetirizine 10mg and then write precise 12h retrospective diary. If less severe reaction take only the 2 Tabl Cetirizine. Please provide patient with key chain box for these emergency medications 10 tablet 1    dapagliflozin (FARXIGA) 10 MG TABS tablet Take 1 tablet (10 mg) by mouth daily. 90 tablet 3    diphenhydrAMINE (BENADRYL ALLERGY) 25 MG tablet Take by mouth as needed.      EPINEPHrine (ANY BX GENERIC EQUIV) 0.3 MG/0.3ML injection 2-pack Inject 0.3 mLs (0.3 mg) into the muscle as needed for anaphylaxis May repeat one time in 5-15 minutes if response to initial dose is inadequate. 2 each 1    predniSONE (DELTASONE) 50 MG tablet Emergency set: if severe allergic reaction take immediately 100mg Prednisone (2x50mg) and 2 Tabl Cetirizine 10mg and then write precise 12h retrospective diary. If less severe reaction take only the 2 Tabl " Cetirizine. Please provide patient with key chain box for these emergency medications 2 tablet 3    amphetamine-dextroamphetamine (ADDERALL XR) 20 MG 24 hr capsule Take 20 mg by mouth daily as needed (Patient not taking: Reported on 5/2/2025)      azelastine (ASTELIN) 0.1 % nasal spray Spray 1 spray into both nostrils 2 times daily (Patient not taking: Reported on 5/2/2025) 30 mL 3    cetirizine (ZYRTEC) 10 MG tablet Emergency set: if severe allergic reaction take immediately 100mg Prednisone (2x50mg) and 2 Tabl Cetirizine 10mg and then write precise 12h retrospective diary. If less severe reaction take only the 2 Tabl Cetirizine. Please provide patient with key chain box for these emergency medications (Patient not taking: Reported on 5/2/2025) 30 tablet 1    clobetasol propionate (CLOBEX) 0.05 % external shampoo 2x weekly for hair wash (Patient not taking: Reported on 5/2/2025) 118 mL 3    ketoconazole (NIZORAL) 2 % external shampoo Apply topically twice a week 2 times weekly (Patient not taking: Reported on 5/2/2025) 120 mL 3    mometasone (ELOCON) 0.1 % external solution Apply topically twice a week (Patient not taking: Reported on 5/2/2025) 60 mL 3    predniSONE (DELTASONE) 50 MG tablet Emergency set: if severe allergic reaction take immediately 100mg Prednisone (2x50mg) and 2 Tabl Cetirizine 10mg and then write precise 12h retrospective diary. If less severe reaction take only the 2 Tabl Cetirizine. Please provide patient with key chain box for these emergency medications (Patient not taking: Reported on 5/2/2025) 2 tablet 4             ALLERGIES:    Allergies   Allergen Reactions    Peanut-Containing Drug Products Anaphylaxis    Nuts Rash     Tree nuts    Shellfish Allergy Rash     REVIEW OF SYSTEMS:  A comprehensive review of systems was performed and found to be negative except as described here or above.  SOCIAL HISTORY:       FAMILY MEDICAL HISTORY:   Family History   Problem Relation Age of Onset     Alport syndrome Mother     Kidney failure Maternal Grandfather         ESKD in his 40s    Alport syndrome Maternal Grandfather     Hearing Loss Maternal Grandfather     Cerebrovascular Disease Maternal Grandfather         Diet at age 50 due to aortic valve infection and stroke    Hematuria Maternal Aunt     Alport syndrome Maternal Aunt         Kidney transplant at age 39    Alport syndrome Maternal Great-Grandmother         ESKD in her 80s-90s, declined dialysis    Diabetes No family hx of     Glaucoma No family hx of     Macular Degeneration No family hx of      PHYSICAL EXAM:   /81   Pulse 60   Temp 97.4  F (36.3  C) (Oral)   Wt 70.8 kg (156 lb)   SpO2 99%   BMI 21.06 kg/m      GENERAL APPEARANCE: alert and no distress  EYES: nonicteric  NECK: supple, no adenopathy  RESP: lungs clear   Cor: RRR, no r/g/m  L Ext No swelling  SKIN: no facial rash  NEURO: mentation intact and speech normal  PSYCH: affect normal/bright   LABS:   Recent Results (from the past 4 weeks)   UA with Microscopic    Collection Time: 05/01/25  3:16 PM   Result Value Ref Range    Color Urine Light Yellow Colorless, Straw, Light Yellow, Yellow    Appearance Urine Clear Clear    Glucose Urine >=1000 (A) Negative mg/dL    Bilirubin Urine Negative Negative    Ketones Urine Negative Negative mg/dL    Specific Gravity Urine 1.021 1.003 - 1.035    Blood Urine Large (A) Negative    pH Urine 6.5 5.0 - 7.0    Protein Albumin Urine 100 (A) Negative mg/dL    Urobilinogen Urine Normal Normal mg/dL    Nitrite Urine Negative Negative    Leukocyte Esterase Urine Negative Negative    RBC Urine 44 (H) <=2 /HPF    WBC Urine 1 <=5 /HPF   Protein  random urine    Collection Time: 05/01/25  3:16 PM   Result Value Ref Range    Total Protein Urine mg/dL 132.0   mg/dL    Total Protein Urine mg/mg Creat 1.55 (H) 0.00 - 0.20 mg/mg Cr    Creatinine Urine mg/dL 85.1 mg/dL   Renal panel    Collection Time: 05/01/25  3:18 PM   Result Value Ref Range    Sodium 140  135 - 145 mmol/L    Potassium 4.0 3.4 - 5.3 mmol/L    Chloride 102 98 - 107 mmol/L    Carbon Dioxide (CO2) 31 (H) 22 - 29 mmol/L    Anion Gap 7 7 - 15 mmol/L    Glucose 80 70 - 99 mg/dL    Urea Nitrogen 12.5 6.0 - 20.0 mg/dL    Creatinine 1.13 0.67 - 1.17 mg/dL    GFR Estimate >90 >60 mL/min/1.73m2    Calcium 8.8 8.8 - 10.4 mg/dL    Albumin 4.0 3.5 - 5.2 g/dL    Phosphorus 3.5 2.5 - 4.5 mg/dL   CBC with platelets    Collection Time: 05/01/25  3:18 PM   Result Value Ref Range    WBC Count 7.5 4.0 - 11.0 10e3/uL    RBC Count 5.11 4.40 - 5.90 10e6/uL    Hemoglobin 15.4 13.3 - 17.7 g/dL    Hematocrit 44.0 40.0 - 53.0 %    MCV 86 78 - 100 fL    MCH 30.1 26.5 - 33.0 pg    MCHC 35.0 31.5 - 36.5 g/dL    RDW 12.3 10.0 - 15.0 %    Platelet Count 161 150 - 450 10e3/uL     CMP  Recent Labs   Lab Test 05/01/25  1518 10/09/24  1554 04/03/24  1129 02/28/24  1119 01/17/24  1600 05/31/23  1441    143 142 140 140 141   POTASSIUM 4.0 3.5 3.8 4.5 3.8 4.4   CHLORIDE 102 103 102 101 102 102   CO2 31* 31* 30* 30* 30* 29   ANIONGAP 7 9 10 9 8 10   GLC 80 87 93 102* 89 94   BUN 12.5 11.0 10.7 16.0 12.9 9.5   CR 1.13 1.00 0.98 0.93 0.94 0.84   GFRESTIMATED >90 >90 >90 >90 >90 >90   SAMANTHA 8.8 9.9 9.5 9.5 9.1 9.6   PHOS 3.5 3.8  --  4.2  --  3.3   PROTTOTAL  --   --  6.9 6.6 6.3*  --    ALBUMIN 4.0 4.4 4.4 4.4 4.3 4.3   BILITOTAL  --   --  0.7 1.0 0.6  --    ALKPHOS  --   --  69 65 63*  --    AST  --   --  24 25 24  --    ALT  --   --  10 15 11  --      CBC  Recent Labs   Lab Test 05/01/25  1518 10/09/24  1554 04/03/24  1129 01/17/24  1600   HGB 15.4 16.3 16.1 14.5   WBC 7.5 8.5 7.2 6.9   RBC 5.11 5.28 5.31 4.87   HCT 44.0 45.9 45.6 41.1   MCV 86 87 86 84    175 178 182     URINE STUDIES  Recent Labs   Lab Test 05/01/25  1516 04/03/24  1132 01/17/24  1554 05/31/23  1441   APPEARANCE Clear Clear Clear Clear   URINEGLC >=1000* 1000* Negative Negative   URINEBILI Negative Negative Negative Negative   URINEKETONE Negative Negative  Negative Negative   SG 1.021 1.030 1.008 1.016   UBLD Large* Large* Large* Large*   URINEPH 6.5 6.5 7.0 7.5*   PROTEIN 100* 300* 100* 200*   NITRITE Negative Negative Negative Negative   LEUKEST Negative Negative Negative Negative   RBCU 44* >182* 68* 74*   WBCU 1 2 <1 1      Latest Reference Range & Units 05/31/23 14:41 01/17/24 15:54 04/03/24 11:32 10/09/24 16:05 05/01/25 15:16   Total Protein Urine mg/mg Creat 0.00 - 0.20 mg/mg Cr 1.91 (H) 2.19 (H) 2.37 (H) 2.80 (H) 1.55 (H)   (H): Data is abnormally high        ASSESSMENT AND PLAN:   Mr Gaytan is seen for X linked Alport syndrome.   He is doing well and reports no acute complaints.  He is tolerating the SGLT2i well without AE.    He does report episodes of symptomatic orthostatic hypotension -   We discussed and agreed to stop the very small dose of losartan.  If he continues to have these events, we would decrease the dose of dapagliglozin as well   RTC in 6 m..    Prasanna Aguilar MD  Division of Renal Disease and Hypertension  May 2, 2025

## 2025-06-03 ENCOUNTER — TELEPHONE (OUTPATIENT)
Dept: MULTI SPECIALTY CLINIC | Facility: CLINIC | Age: 20
End: 2025-06-03
Payer: COMMERCIAL

## 2025-06-03 NOTE — TELEPHONE ENCOUNTER
Left Voicemail (1st Attempt) and Sent Mychart (1st Attempt) for the patient to schedule:    Appointment type: Return Kidney Genetics  Provider: Dr. Prasanna Aguilar   Return date: Approx. 11/2/25  Phone number: [phone]  Add'l appt(s) needed: Lab 1-hour prior to clinic visit (or 1-week if video visit)  Notes:

## 2025-06-24 ENCOUNTER — OFFICE VISIT (OUTPATIENT)
Dept: ALLERGY | Facility: CLINIC | Age: 20
End: 2025-06-24
Payer: COMMERCIAL

## 2025-06-24 DIAGNOSIS — H10.10 SEASONAL AND PERENNIAL ALLERGIC RHINOCONJUNCTIVITIS: ICD-10-CM

## 2025-06-24 DIAGNOSIS — Z91.09 HOUSE DUST MITE ALLERGY: ICD-10-CM

## 2025-06-24 DIAGNOSIS — J30.2 SEASONAL AND PERENNIAL ALLERGIC RHINOCONJUNCTIVITIS: ICD-10-CM

## 2025-06-24 DIAGNOSIS — J30.89 SEASONAL AND PERENNIAL ALLERGIC RHINOCONJUNCTIVITIS: ICD-10-CM

## 2025-06-24 DIAGNOSIS — J30.1 SEASONAL ALLERGIC RHINITIS DUE TO POLLEN: Primary | ICD-10-CM

## 2025-06-24 DIAGNOSIS — J32.9 CHRONIC RHINOSINUSITIS: ICD-10-CM

## 2025-06-24 PROCEDURE — 99213 OFFICE O/P EST LOW 20 MIN: CPT | Performed by: DERMATOLOGY

## 2025-06-24 RX ORDER — AZELASTINE HYDROCHLORIDE 0.5 MG/ML
1 SOLUTION/ DROPS OPHTHALMIC 2 TIMES DAILY
Qty: 6 ML | Refills: 3 | Status: SHIPPED | OUTPATIENT
Start: 2025-06-24

## 2025-06-24 RX ORDER — FEXOFENADINE HCL 180 MG/1
180 TABLET ORAL DAILY PRN
Qty: 60 TABLET | Refills: 2 | Status: SHIPPED | OUTPATIENT
Start: 2025-06-24

## 2025-06-24 RX ORDER — AZELASTINE 1 MG/ML
1 SPRAY, METERED NASAL 2 TIMES DAILY PRN
Qty: 30 ML | Refills: 3 | Status: SHIPPED | OUTPATIENT
Start: 2025-06-24

## 2025-06-24 NOTE — NURSING NOTE
Chief Complaint   Patient presents with    Allergy Testing Followup     Seasonal allergies have been worse this year more than ever, taking BID zyrtec, ongoing significant congestion, swelling around eyes, etc, wondering about alternative treatments.      Chemo Arce RN

## 2025-06-24 NOTE — PROGRESS NOTES
[facility] Dermato-allergology Note  Office visit  Encounter Date: Jun 24, 2025  ____________________________________________    CC: Allergy Testing Followup (Seasonal allergies have been worse this year more than ever, taking BID zyrtec, ongoing significant congestion, swelling around eyes, etc, wondering about alternative treatments. )    HPI:  (Jun 24, 2025)  Mr. Valentin Gaytan is a(n) 19 year old male who presents today as a return patient for allergy consultation  - Follow-up in Derm-Allergy clinic PRN   - Seasonal allergies are worsening, started late March, early April; worse this year compared to prior year  - Swollen eyes, watery eyes, throat itching   - Taking 1-2 zyrtec daily, no eye drops  - Also did steroid nasal spray when symptoms were really bad  - No asthma   - Last fall was also bad, but not as significant as this spring  - Having symptoms year round due to dust mites, worse in the winter  - Otherwise feeling well in usual state of health    Physical Exam:  General: In no acute distress, well-developed, well-nourished  Eyes: no conjunctivitis  ENT: no signs of rhinitis   Pulmonary: no wheezing or coughing  Skin:Focused examination of the skin on test sites was performed = see test results below    Earlier History and Allergy Exams:  (May 31, 2024) ---> (7/16/24)?  Presents today as a return patient for allergy consultation  - Follow-up in Derm-Allergy clinic if necessary  - On 5/26/24, ate pure vanilla soft serve ice cream in a bowl at a restaurant; believes spoon used to serve his ice cream was used to serve other types of ice cream  - Lip swelling started within a few minutes  - Throat became itchy and felt a little bit tighter  - As soon as throat started to hurt, took 2 prednisone and 2 Zyrtec  - Progressed slightly for 4-5 minutes where throat was sore, but he could still breath perfectly fine  - 20-25 minutes of being uncomfortable but did not worsen  - Improved and then  resolved over the next 2-3 hours  - Did not use EpiPen  - Unsure of exactly what spoon came into contact with for cross-contamination to have occurred  - Feels peanut is bigger allergy than tree nut  - No issues with soy, green peas, lima beans, or other beans  - Mostly avoids lima beans because he does not like the texture  - Skin is doing good and he has not had any issues  - Using shampoo 2x/week  - Applying lotion after every shower    (Sep 29, 2023)  - Follow-up in Derm-Allergy clinic for 2nd readings and final conclusions after 4 days.    (Sep 27, 2023)  - Follow-up in Derm-Allergy clinic for 1st readings of patch tests after 2 days. Perform prick tests to fresh shrimp and nuts and bean panel    (Sep 25, 2023)  - Follow-up in Derm-Allergy clinic for patch tests as planned and prick/prick fresh shrimp and maybe do entire nut/beans panel without peanuts    (Jul 12, 2023)  - Was living before in 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 Stopped that about 3 weeks ago.  - got in Feb 2023 from Dr. Sudhakar Landrum and this improved the situation  - has also some scalp dermatitis with reactions to some shampoos    dandruff in scalp with slightly erythematous scalp skin and periorbital hyperpigmentations    Past Medical History:   Patient Active Problem List   Diagnosis    Eczema, unspecified type    X-linked Alport syndrome     Past Medical History:   Diagnosis Date    Hematuria     Proteinuria     X-linked Alport syndrome     Mother genetic testing: COL4A5 splice site variant, COL4A5 c.[id number]     Allergies:  Allergies   Allergen Reactions     Peanut-Containing Drug Products Anaphylaxis    Nuts Rash     Tree nuts    Shellfish Allergy Rash     Medications:  Current Outpatient Medications   Medication Sig Dispense Refill    amphetamine-dextroamphetamine (ADDERALL XR) 20 MG 24 hr capsule Take 20 mg by mouth daily as needed (Patient not taking: Reported on 5/2/2025)      amphetamine-dextroamphetamine (ADDERALL) 10 MG tablet TAKE 1/2 TO 2 TABLETS BY MOUTH TWICE DAILY (4 HOURS APART) AS NEEDED FOR ADHD. *MAX 2 TABS PER DAY*      azelastine (ASTELIN) 0.1 % nasal spray Spray 1 spray into both nostrils 2 times daily (Patient not taking: Reported on 5/2/2025) 30 mL 3    cetirizine (ZYRTEC) 10 MG tablet Emergency set: if severe allergic reaction take immediately 100mg Prednisone (2x50mg) and 2 Tabl Cetirizine 10mg and then write precise 12h retrospective diary. If less severe reaction take only the 2 Tabl Cetirizine. Please provide patient with key chain box for these emergency medications (Patient not taking: Reported on 5/2/2025) 30 tablet 1    cetirizine (ZYRTEC) 10 MG tablet Emergency set: if severe allergic reaction take immediately 100mg Prednisone (2x50mg) and 2 Tabl Cetirizine 10mg and then write precise 12h retrospective diary. If less severe reaction take only the 2 Tabl Cetirizine. Please provide patient with key chain box for these emergency medications 10 tablet 1    clobetasol propionate (CLOBEX) 0.05 % external shampoo 2x weekly for hair wash (Patient not taking: Reported on 5/2/2025) 118 mL 3    dapagliflozin (FARXIGA) 10 MG TABS tablet Take 1 tablet (10 mg) by mouth daily. 90 tablet 3    diphenhydrAMINE (BENADRYL ALLERGY) 25 MG tablet Take by mouth as needed.      EPINEPHrine (ANY BX GENERIC EQUIV) 0.3 MG/0.3ML injection 2-pack Inject 0.3 mLs (0.3 mg) into the muscle as needed for anaphylaxis May repeat one time in 5-15 minutes if response to initial dose is inadequate. 2 each 1    ketoconazole (NIZORAL) 2 % external shampoo Apply topically twice  a week 2 times weekly (Patient not taking: Reported on 5/2/2025) 120 mL 3    mometasone (ELOCON) 0.1 % external solution Apply topically twice a week (Patient not taking: Reported on 5/2/2025) 60 mL 3    predniSONE (DELTASONE) 50 MG tablet Emergency set: if severe allergic reaction take immediately 100mg Prednisone (2x50mg) and 2 Tabl Cetirizine 10mg and then write precise 12h retrospective diary. If less severe reaction take only the 2 Tabl Cetirizine. Please provide patient with key chain box for these emergency medications (Patient not taking: Reported on 5/2/2025) 2 tablet 4    predniSONE (DELTASONE) 50 MG tablet Emergency set: if severe allergic reaction take immediately 100mg Prednisone (2x50mg) and 2 Tabl Cetirizine 10mg and then write precise 12h retrospective diary. If less severe reaction take only the 2 Tabl Cetirizine. Please provide patient with key chain box for these emergency medications 2 tablet 3     No current facility-administered medications for this visit.     Social History:  The patient is a student. Patient has the following hobbies or non-occupational exposure: N/A.     Family History:  Family History   Problem Relation Age of Onset    Alport syndrome Mother     Kidney failure Maternal Grandfather         ESKD in his 40s    Alport syndrome Maternal Grandfather     Hearing Loss Maternal Grandfather     Cerebrovascular Disease Maternal Grandfather         Diet at age 50 due to aortic valve infection and stroke    Hematuria Maternal Aunt     Alport syndrome Maternal Aunt         Kidney transplant at age 39    Alport syndrome Maternal Great-Grandmother         ESKD in her 80s-90s, declined dialysis    Diabetes No family hx of     Glaucoma No family hx of     Macular Degeneration No family hx of    Father has strong eczema and got IT in [place]    Previous Labs, Allergy Tests, Dermatopathology, Imaging:  Feb 2023 with Dr. JACK De La Fuente  # Eyelid dermatitis - right upper lid  Atopic predisposition  w/ seasonal allergy. Right handed. Suspected ACD. Will start protopic  - Tacrolimus ointment BID - discussed black box warning, application strategies and side effects (burning)  - Dr. 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       Skin Atopy (atopic dermatitis) [x] Yes   [] No ...dry skin.  Contact allergies:   [x] Yes   [] No ...band aid some rash for 24h  Hand eczema:   [] Yes   [x] No           Leading hand:   [] R   [] L       [] Ambidextrous         Drug allergies:        [] Yes   [x] No  which?......    Urticaria/Angioedema  [] Yes   [] No .........  Food Allergy:  [x] Yes   [] No  Which?.peanuts = last time accidentally in March with contaminated ice cream and slight throat swelling --> lip/throat swelling and hives --> proven by skin tests about 10 years ago  Tree nuts: almonds > hazelnuts > pistachios = mouth swelling and hives, but no breathing problems  Shellfish = mouth swelling, fish OK, no problems with fruits  Pets :  [] Yes   [x] No  Which?.RC to dogs and cats         [x]  Rhinitis   [x] Conjunctivitis   [] Sinusitis   [] Polyposis   [] Otitis   [] Pharyngitis         []  Postnasal drip    []  none  Operations:   [] Tonsils   [] Septum   [] Sinus   [] Polyposis        [] Asthma bronchiale   [] Coughing      [x]  none  Symptoms (mostly Rhinoconjunctivitis and Asthma) aggravated by:  Season   [] I   [] II   [] III   [] IV   [x]V   [x]VI   []VII   [x]VIII   [x]IX   []X   []XI   []XII     [] perennial   Day time      [] morning   [] noon      [] evening        [] night    [x] whole day........  []  none  Location/changes    [] inside        [x] outside   [] mountains    [] sea     [] others.............   []  none  Triggers, specific      [x] animals     [x] plants     [] dust              [] others ...........................    []  none  Triggers, others       [] work          [] psyche    [] sport            [] others .............................  [x]  none  Irritant                [] phys efforts [] smoke    [] heat/cold     [] odors  []others............... [x]  none    Order for PATCH TESTS  Reason for tests (suspected allergy): in September/October  Known previous allergies: none  Standardized panels  [x] Standard panel (40 tests)  [x] Preservatives & Antimicrobials (31 tests)  [x] Emulsifiers & Additives (25 tests)   [x] Perfumes/Flavours & Plants (25 tests)  [] Hairdresser panel (12 tests)  [] Rubber Chemicals (22 tests)  [x] Plastics (26 tests)  [] Colorants/Dyes/Food additives (20 tests)  [x] Metals (implants/dental) (24 tests)  [] Local anaesthetics/NSAIDs (13 tests)  [] Antibiotics & Antimycotics (14 tests)   [] Corticosteroids (15 tests)   [] Photopatch test (62 tests)   [] others: ...      [] Patient's own products: ...  DO NOT test if chemical or biological identity is unknown!   always ask from patient the product information and safety sheets (MSDS)       Order for PRICK TESTS    Reason for tests (suspected allergy): atopy with food allergy  Known previous allergies: none    Standardized prick panels  [x] Atopic panel (20 tests)  [] Pediatric Panel (12 tests)  [] Milk, Meat, Eggs, Grains (20 tests)   [] Dust, Epithelia, Feathers (10 tests)  [x] Fish, Seafood, Shellfish (17 tests)  [] Nuts, Beans (14 tests)  [] Spice, Vegetable, Fruit (17 tests)  [] Pollen Panel = Tree, Grass, Weed (24 tests)  [x] Others: peanuts, hazelnuts, almonds.      [] Patient's own products: ...  DO NOT test if chemical or biological identity is unknown!   always ask from patient the product information and safety sheets (MSDS)     Standardized intradermal tests  [] Penicillium notatum [] Aspergillus fumigatus [] House dust mites D.far & D. pteron  []  Cat    [] dog  [] Others: ...  [] Bee venom   [] Wasp venom  !!Specific protocol with dilutions!!       Order for Drug allergy tests (prick & Intradermal & patch tests)    [] Penicillin G  [] Ampicillin [] Cefazolin   [] Ceftriaxone   [] Ceftazidime  [] Bactrim    [] Others: ...  Order for ... as test date      Atopy Screen (Placed Jul 12, 2023)  No Substance Readings (15 min) Evaluation   POS Histamine 1mg/ml ++    NEG NaCl 0.9% -      No Substance Readings (15 min) Evaluation   1 Alternaria alternata (tenuis)  +    2 Cladosporium herbarum +    3 Aspergillus fumigatus -    4 Penicillium notatum -    5 Dermatophagoides pteronyssinus ++++    6 Dermatophagoides farinae ++++    7 Dog epithelium (canis spp) -    8 Cat hair (shine catus) ++    9 Cockroach   (Blatella americana & germanica) -    10 Grass mix midwest   (Yazmin, Orchard, Redtop, Wesley) ++    11 Sunday grass (sorghum halepense) ++    12 Weed mix   (common Cocklebur, Lamb s quarters, rough redroot Pigweed, Dock/Sorrel) ++    13 Mug wort (artemisia vulgare) ++    14 Ragweed giant/short (ambrosia spp) ++    15      16 Tree mix 1 (Pecan, Maple BHR, Oak RVW, american [place], black [place]) +/++    17 Red cedar (juniperus virginia) +    18 Tree mix 2   (white Gregory, river/red Birch, black [place], common Will[place], american Elm) ++    19 Box elder/Maple mix (acer spp) +    20 [place] shagbark (carya ovata) ++    Conclusion:    Fish and Seafood (Placed Jul 12, 2023)  No Substance Readings (15min) Evaluation   POS Histamine 1mg/ml ++    NEG NaCl 0.9% -      No Substance Readings (15min) Evaluation   1 peanuts ++++    2 hazelnut +    3 almond +/++    4 pistachios +    11 Crab (callinectes spp) -    12 Lobster (homarus americanus) -    13 Shrimp white/brown/pink (farfantepenaeus&litopenaeus) -    14 Kingcrab (paralithodes camtschatica) -    15 Scallop (placopecten magellanicus) -    16 Clam (mercenaria mercenaria) -    17 Oyster (cstrea/crassostrea) -     Conclusion:    Nuts and Beans (Placed Sep 27, 2023)  No Substance Readings (15min) Evaluation   POS Histamine 1mg/ml ++    NEG NaCl 0.9% -      No Substance Readings (15min) Evaluation   1 [place] (prunus dulcis) ++    2 Brazil nut (bertholletia excels) -    3 Cashew nut (anacardium occidentale) -    4 Coconut (cocos nucifera) -    5 Hazelnut (corylus americana) -    6 Pecan (carya illinoinensis) -    7 Marble (juglans regia) -    8 Pistachio nut (pistacia vera) -    9 peanuts +++    10 Ak[place] Locke (phaseolus lunatus) ++    11 String Locke (phaseolus vulgaris) -    12 Kidney bean (phaseolus vulgaris) -    13 Green Pea (pisum sativum) +/++    14 Soybean (glycine max) (+)    15 Fresh shrimp prick/prick ++    Conclusion: mostly reactions to beans and peanut, less to nuts (except almond)    RESULTS & EVALUATION of PATCH TESTS    Sep 25, 2023 application of patch tests:    Patch test readings after     [x] 2 days, [] 3 days [x] 4 days, [] 5 days,  Other duration: ...    STANDARD Series                                          # Substance 2 days 4 days remarks     1 Garret Mix [C] - -       2 Colophony - -       3  2-Mercaptobenzothiazole  - -       4 Methylisothiazolinone - -       5 Carba Mix - -       6 Thiuram Mix [A] - -       7 Bisphenol A Epoxy Resin - -       8 E-Afib-Xydefaafkaa-Formaldehyde Resin - -       9 Mercapto Mix [A] - -       10 Black Rubber Mix- PPD [B] - -       11 Potassium Dichromate  -  -       12 Balsam of [place] (Myroxylon Pereirae Resin) - -       13 Nickel Sulphate Hexahydrate - +       14 Mixed Dialkyl Thiourea - -       15 Paraben Mix [B] - -       16 Methyldibromo Glutaronitrile - -       17 Fragrance Mix 8% - -       18 2-Bromo-2-Nitropropane-1,3-Diol (Bronopol) CT - -       19 Lyral - -       20 Tixocortol-21- Pivalate CT - -       21 Diazolidinyl urea (Germall II) - -        22 Methyl Methacrylate - -       23 Cobalt (II) Chloride Hexahydrate (+) -       24 Fragrance Mix II  - -       25  Compositae Mix - -       26 Benzoyl Peroxide - -       27 Bacitracin - -       28 Formaldehyde - -       29 Methylchloroisothiazolinone / Methylisothiazolinone - -       30 Corticosteroid Mix CT - -       31 Sodium Lauryl Sulfate - -       32 Lanolin Alcohol - -       33 Turpentine - -       34 Cetylstearylalcohol - -       35 Chlorhexidine Dicluconate - -       36 Budesonide - -       37 Imidazolidinyl Urea  - -       38 Ethyl-2 Cyanoacrylate NA NA       39 Quaternium 15 (Dowicil 200) - -       40 Decyl Glucoside - -     PRESERVATIVES & ANTIMICROBIALS        # Substance 2 days 4 days remarks   41 1  1,2-Benzisothiazoline-3-One, Sodium Salt - -     2  1,3,5-Pablo (2-Hydroxyethyl) - Hexahydrotriazine (Grotan BK) - -     3 2-Vxqpssruevqkd-3-Nitro-1, 3-Propanediol - -     4  3, 4, 4' - Triclocarban - -    45 5 4 - Chloro - 3 - Cresol - -     6 4 - Chloro - 4 - Xylenol (PCMX) - -     7 7-Ethylbicyclooxazolidine (Bioban [id number]) - -     8 Benzalkonium Chloride CT - -     9 Benzyl Alcohol - -    50 10 Cetalkonium Chloride - -     11 Cetylpyrimidine Chloride  - -     12 Chloroacetamide - -     13 DMDM Hydantoin - -     14 Glutaraldehyde - -    55 15 Triclosan - -     16 Glyoxal Trimeric Dihydrate - -     17 Iodopropynyl Butylcarbamate - -     18 Octylisothiazoline - -     19 Bithionol CT NA NA    60 20 Bioban P 1487 (Nitrobutyl) Morpholine/(Ethylnitro-Trimethylene) Dimorpholine - -     21 Phenoxyethanol - -     22 Phenyl Salicylate - -     23 Povidone Iodine - -     24 Sodium Benzoate - -    65 25 Sodium Disulfite - -     26 Sorbic Acid - -     27 Thimerosal - -     28 Melamine Formaldehyde Resin - -     29 Ethylenediamine Dihydrochloride - -      Parabens      70 30 Butyl-P-Hydroxybenzoate - -     31 Ethyl-P-Hydroxybenzoate - -     32 Methyl-P-Hydroxybenzoate - -    73 33 Propyl-P-Hydroxybenzoate - -    EMULSIFIERS & ADDITIVES       # Substance 2 days 4 days remarks   74 1 Polyethylene Glycol-400 - -    75 2 Cocamidopropyl  Betaine - -     3 Amerchol L101 - -     4 Propylene Glycol - -     5 Triethanolamine - -     6 Sorbitane Sesquiolate CT - -    80 7 Isopropylmyristate - -     8 Polysorbate 80 CT - -     9 Amidoamine   (Stearamidopropyl Dimethylamine) - -     10 Oleamidopropyl Dimethylamine - -     11 Lauryl Glucoside - -    85 12 Coconut Diethanolamide  - -     13 2-Hydroxy-4-Methoxy Benzophenone (Oxybenzone) - -     14 Benzophenone-4 (Sulisobenzon) - -     15 Propolis - -     16 Dexpanthenol - -    90 17 Abitol - -     18 Tert-Butylhydroquinone - -     19 Benzyl Salicylate - -     20 Dimethylaminopropylamin (DMPA) - -     21 Zinc Pyrithione (Zinc Omadine)  - -    95 22 Pablo(Hydroxymethyl) Nitromethane  - -      Antioxidant       23 Dodecyl Gallate - -     24 Butylhydroxyanisole (BHA) - -     25 Butylhydroxytoluene (BHT) - -     26 Di-Alpha-Tocopherol (Vit E) - -    100 27 Propyl Gallate - -    PERFUMES, FLAVORS & PLANTS        # Substance 2 days 4 days remarks   101 1 Benzyl Cinnamate - -     2 Di-Limonene (Dipentene) - -     3 Cananga Odorata (Jonelle Sal) (I) - -     4 Lichen Acid Mix - -    105 5 Mentha Piperita Oil (Peppermint Oil) - -     6 Sesquiterpenelactone mix - -     7 Tea Tree Oil, Oxidized - -     8 Wood Tar Mix (+) -     9 Abietic Acid - -    110 10 Lavendula Angustifolia Oil (Lavender Oil) - -     11 Fragrance mix II CT * 14% - -      Fragrance Mix I       12 [place]moss Absolute - -     13 Eugenol - -     14 Geraniol - -    115 15 Hydroxycitronellal - -     16 Isoeugenol - -     17 Cinnamic Aldehyde - -     18 Cinnamic Alcohol  - -      Fragrance mix II       19 Citronellol - -    120 20 Alpha-Hexylcinnamic Aldehyde    - -     21 Citral - -     22 Farnesol - -    123 23 Coumarin - -    Hexylcinnamic aldehyde, Coumarin, Farnesol, Hydroxyisohexy3-cyclohexene carboxaldehyde, citral, citrolellol  PLASTICS (Acrylates/Epoxy Systems)       # Substance 2 days 4 days remarks     Acrylates - -    124 1 2-Hydroxyethyl  Methacrylate (HEMA) - -    125 2 1,4-Butandioldimethacrylate (BUDMA) - -     3  2-Ethylhexyl Acrylate - -     4 Bisphenol-A-Dimethacrylate  - -     5 Diurethane-Dimethacrylate - -     6 Ethyleneglycoldimethacrylate (EGDMA) - -    130 7 Pentaerythritoltriacrylate (MEENU) - -     8 Triethylene Glycol Dimethacrylate (TEGDMA) - -      Synthetic material/additives        9 J-Brqo-Bnqnpxfwdgf - -     10 Tricresyl Phosphate - -     11 2-Vnwh-Ddbgufrrtscay - -    135 12 Dioctyl phtalate(DEHP,DOP) / (Dimethylhexylphthalate)  - -     13 Dibutylphthalate - -     14 Dimethylphthalate - -     15 Toluene-2,4-Diisocyanate NA NA     16 Diphenylmethane-4,4''-Diisocyanate NA NA      EPOXY RESIN SYSTEMS        Reactive Solvents - -    140 17 Cresyl Glycidyl Ether NA NA     18 Butyl Glycidyl Ether - -     19 Phenyl Glycidyl Ether - -     20 1,4-Butanediol Diglycidyl Ether - -     21 1,6-Hexanediole Diglycidyl Ether - -      Hardener / Accelerator - -    145 22 Triethylenetetramine - -     23 Diethylenetriamine - -     24 Isophorone Diamine (IPD) - -     25 N,N-Dimethyl-P-Toluidine - -    149 26 Isobornyl Acrylate - -    METALS (Implants / Dental)        # Substance 2 days 4 days remarks   150 1 Ammonium Heptamolybdate (IV) - -     2 Ammonium Tetrachloroplatinate - -     3 Indium (III) Chloride - -     4 Iridium (III) Chloride - -     5 Ferric Chloride - -    155 6 Manganese (II) Chloride - -     7 Niobium (V) Chloride - -     8 Palladium Chloride - -     9 Silver Nitrate - -     10 Gold Sodium Thiosulfate - -    160 11 Tantal - -     12 Tin (II) Chloride - -     13 Titanium (IV) Oxide - -     14 Titanium - -     15 Tungstic Acid, Sod Salt Dihydrat - -    165 16 Vanadium Pentoxide - -     17 Wolfram - -     18 Zinc Chloride - -     19 Zirconium (IV) Oxide - -     20 Ammoniated Murcury - -    170 21 Copper Sulfate Pentahydrate (+) -     22 Amalgam  - -     23 Aluminum Hydroxide - -    173 24 Ammonium Hexachloroplatinate - -      Results  of patch tests:                         Interpretation:  - Negative                    A    = Allergic      (+) Erythema    TI   = Toxic/irritant   + E + Infiltration    RaP = Relevance at Present     ++ E/I + Papulovesicle   Rpr  = Relevance Previously     +++ E/I/P + Blister     nR   = No Relevance    [x] No relevant allergic reaction observed  Slight reaction to Nickel    [] Allergic reaction diagnosed against following allergens:    Interpretation/Remarks:   No clear contact sensitization in patch tests, but patient in prick tests and by hx very atopic and the borderline Nickel allergy fits well into this picture. Dermatitis is therefore mostly atopic dermatitis.     [x] Patient information given   [] ACDS CAMP information (# ....) to following compounds: .....   [x] General information to following compounds: Nickel    ____________________________________________     Assessment & Plan:    ==> Final Diagnosis:     # Atopic predisposition with:  Seasonal RC in late spring (tree pollens), summer (grass), and fall (weed pollens)  Sensitization to dust mites --> might explain cold symptoms in winter  Food allergies to peanuts >>> tree nuts ==> throat swelling and urticaria  Shellfish allergy with oral itching  Positive family history  Maybe atopic dermatitis with dry skin and recurrent periorbital dermatitis  * chronic illness with exacerbation, progression, side effects from treatment    # Recurrent periorbital dermatitis right side and scalp dermatitis  >> mostly atopic dermatitis with irritant component  No signs for relevant contact sensitization (slightly to Nickel)  * chronic illness with exacerbation, progression, side effects from treatment    These conclusions are made at the best of one's knowledge and belief based on the provided evidence such as patient's history and allergy test results and they can change over time or can be incomplete because of missing information.    ==> Treatment Plan:    >>  Refilled prednisone for emergency set if needed again in the future (rest of emergency set re-prescribed at patient's request on 5/28/24).    >> Continue with regular moisturization after each shower.  - Try to avoid overly-scented products.    >> Continue with ketoconazole shampoo and Clobex shampoo each 2x/week, alternating.    >> Detailed discussion regarding subcutaneous allergy immunotherapy for allergies.   - Will refer patient for consultation with allergist Dr. Jean regarding allergy shots for pollen and dust mite allergies; will not repeat testing today, as it likely will be completed with Dr. Jean.   - Prescribed azelastine 0.05% eye drops PRN; apply 1 drop to eye 2 times daily  - Prescribed azelastine 0.1% nasal spray; spray 1 spray into both nostrils 2 times daily as needed (during pollen season)  - Prescribed Allegra 180 mg instead of Zyrtec; take 1 tablet (180 mg) by mouth daily as needed for allergies (during pollen seasonal 1-2 Tabl daily)    Procedures Performed: none    Staff Involved: Provider, Staff, Resident, and Scribe    Scribe Disclosure:   I, Yaya Ridley, am serving as a scribe to document services personally performed by Donell Ho MD based on data collection and the provider's statements to me.     Staff Physician Comments:  I was present with the scribe who participated in the documentation of the note. I have verified the history and personally performed the physical exam and medical decision making. I agree with the assessment and plan as documented in the note. I have reviewed and if necessary amended the note.      Donell Ho MD  Professor  Head of Dermato-Allergy Division  Department of Dermatology  [facility]      Follow-up in Derm-Allergy clinic if necessary; follow with Dr. Jean  ___________________________    I spent a total of 20 minutes with Valentin Gaytan during today s  visit. This time was spent discussing all the  individual test results, correlating them to the clinical relevance, counseling the patient and/or coordinating care and performing allergy tests or procedures.

## 2025-06-24 NOTE — LETTER
6/24/2025      Valentin Gaytan  [address] [facility] [address]      Dear Colleague,    Thank you for referring your patient, Valentin Gaytan, to the [facility] [place]. Please see a copy of my visit note below.    [facility] Dermato-allergology Note  Office visit  Encounter Date: Jun 24, 2025  ____________________________________________    CC: Allergy Testing Followup (Seasonal allergies have been worse this year more than ever, taking BID zyrtec, ongoing significant congestion, swelling around eyes, etc, wondering about alternative treatments. )    HPI:  (Jun 24, 2025)  Mr. Valentin Gaytan is a(n) 19 year old male who presents today as a return patient for allergy consultation  - Follow-up in Derm-Allergy clinic PRN   - Seasonal allergies are worsening, started late March, early April; worse this year compared to prior year  - Swollen eyes, watery eyes, throat itching   - Taking 1-2 zyrtec daily, no eye drops  - Also did steroid nasal spray when symptoms were really bad  - No asthma   - Last fall was also bad, but not as significant as this spring  - Having symptoms year round due to dust mites, worse in the winter  - Otherwise feeling well in usual state of health    Physical Exam:  General: In no acute distress, well-developed, well-nourished  Eyes: no conjunctivitis  ENT: no signs of rhinitis   Pulmonary: no wheezing or coughing  Skin:Focused examination of the skin on test sites was performed = see test results below    Earlier History and Allergy Exams:  (May 31, 2024) ---> (7/16/24)?  Presents today as a return patient for allergy consultation  - Follow-up in Derm-Allergy clinic if necessary  - On 5/26/24, ate pure vanilla soft serve ice cream in a bowl at a restaurant; believes spoon used to serve his ice cream was used to serve other types of ice cream  - Lip swelling started within a few minutes  - Throat became itchy and felt a little bit tighter  - As soon  as throat started to hurt, took 2 prednisone and 2 Zyrtec  - Progressed slightly for 4-5 minutes where throat was sore, but he could still breath perfectly fine  - 20-25 minutes of being uncomfortable but did not worsen  - Improved and then resolved over the next 2-3 hours  - Did not use EpiPen  - Unsure of exactly what spoon came into contact with for cross-contamination to have occurred  - Feels peanut is bigger allergy than tree nut  - No issues with soy, green peas, lima beans, or other beans  - Mostly avoids lima beans because he does not like the texture  - Skin is doing good and he has not had any issues  - Using shampoo 2x/week  - Applying lotion after every shower    (Sep 29, 2023)  - Follow-up in Derm-Allergy clinic for 2nd readings and final conclusions after 4 days.    (Sep 27, 2023)  - Follow-up in Derm-Allergy clinic for 1st readings of patch tests after 2 days. 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 Stopped that about 3 weeks ago.  - got in Feb 2023 from Dr. Sudhakar Landrum and this improved the situation  - has also some scalp dermatitis with reactions to some shampoos    dandruff in scalp with slightly erythematous scalp skin and periorbital hyperpigmentations    Past Medical History:   Patient Active Problem List   Diagnosis     Eczema, unspecified type      X-linked Alport syndrome     Past Medical History:   Diagnosis Date     Hematuria      Proteinuria      X-linked Alport syndrome     Mother genetic testing: COL4A5 splice site variant, COL4A5 c.[id number]     Allergies:  Allergies   Allergen Reactions     Peanut-Containing Drug Products Anaphylaxis     Nuts Rash     Tree nuts     Shellfish Allergy Rash     Medications:  Current Outpatient Medications   Medication Sig Dispense Refill     amphetamine-dextroamphetamine (ADDERALL XR) 20 MG 24 hr capsule Take 20 mg by mouth daily as needed (Patient not taking: Reported on 5/2/2025)       amphetamine-dextroamphetamine (ADDERALL) 10 MG tablet TAKE 1/2 TO 2 TABLETS BY MOUTH TWICE DAILY (4 HOURS APART) AS NEEDED FOR ADHD. *MAX 2 TABS PER DAY*       azelastine (ASTELIN) 0.1 % nasal spray Spray 1 spray into both nostrils 2 times daily (Patient not taking: Reported on 5/2/2025) 30 mL 3     cetirizine (ZYRTEC) 10 MG tablet Emergency set: if severe allergic reaction take immediately 100mg Prednisone (2x50mg) and 2 Tabl Cetirizine 10mg and then write precise 12h retrospective diary. If less severe reaction take only the 2 Tabl Cetirizine. Please provide patient with key chain box for these emergency medications (Patient not taking: Reported on 5/2/2025) 30 tablet 1     cetirizine (ZYRTEC) 10 MG tablet Emergency set: if severe allergic reaction take immediately 100mg Prednisone (2x50mg) and 2 Tabl Cetirizine 10mg and then write precise 12h retrospective diary. If less severe reaction take only the 2 Tabl Cetirizine. Please provide patient with key chain box for these emergency medications 10 tablet 1     clobetasol propionate (CLOBEX) 0.05 % external shampoo 2x weekly for hair wash (Patient not taking: Reported on 5/2/2025) 118 mL 3     dapagliflozin (FARXIGA) 10 MG TABS tablet Take 1 tablet (10 mg) by mouth daily. 90 tablet 3     diphenhydrAMINE (BENADRYL ALLERGY) 25 MG tablet Take by mouth as needed.       EPINEPHrine (ANY BX  GENERIC EQUIV) 0.3 MG/0.3ML injection 2-pack Inject 0.3 mLs (0.3 mg) into the muscle as needed for anaphylaxis May repeat one time in 5-15 minutes if response to initial dose is inadequate. 2 each 1     ketoconazole (NIZORAL) 2 % external shampoo Apply topically twice a week 2 times weekly (Patient not taking: Reported on 5/2/2025) 120 mL 3     mometasone (ELOCON) 0.1 % external solution Apply topically twice a week (Patient not taking: Reported on 5/2/2025) 60 mL 3     predniSONE (DELTASONE) 50 MG tablet Emergency set: if severe allergic reaction take immediately 100mg Prednisone (2x50mg) and 2 Tabl Cetirizine 10mg and then write precise 12h retrospective diary. If less severe reaction take only the 2 Tabl Cetirizine. Please provide patient with key chain box for these emergency medications (Patient not taking: Reported on 5/2/2025) 2 tablet 4     predniSONE (DELTASONE) 50 MG tablet Emergency set: if severe allergic reaction take immediately 100mg Prednisone (2x50mg) and 2 Tabl Cetirizine 10mg and then write precise 12h retrospective diary. If less severe reaction take only the 2 Tabl Cetirizine. Please provide patient with key chain box for these emergency medications 2 tablet 3     No current facility-administered medications for this visit.     Social History:  The patient is a student. Patient has the following hobbies or non-occupational exposure: N/A.     Family History:  Family History   Problem Relation Age of Onset     Alport syndrome Mother      Kidney failure Maternal Grandfather         ESKD in his 40s     Alport syndrome Maternal Grandfather      Hearing Loss Maternal Grandfather      Cerebrovascular Disease Maternal Grandfather         Diet at age 50 due to aortic valve infection and stroke     Hematuria Maternal Aunt      Alport syndrome Maternal Aunt         Kidney transplant at age 39     Alport syndrome Maternal Great-Grandmother         ESKD in her 80s-90s, declined dialysis     Diabetes No  family hx of      Glaucoma No family hx of      Macular Degeneration No family hx of    Father has strong eczema and got IT in [place]    Previous Labs, Allergy Tests, Dermatopathology, Imaging:  Feb 2023 with Dr. JACK De La Fuente  # Eyelid dermatitis - right upper lid  Atopic predisposition w/ seasonal allergy. Right handed. Suspected ACD. Will start protopic  - Tacrolimus ointment BID - discussed black box warning, application strategies and side effects (burning)  - Dr. 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       Skin Atopy (atopic dermatitis) [x] Yes   [] No ...dry skin.  Contact allergies:   [x] Yes   [] No ...band aid some rash for 24h  Hand eczema:   [] Yes   [x] No           Leading hand:   [] R   [] L       [] Ambidextrous         Drug allergies:        [] Yes   [x] No  which?......    Urticaria/Angioedema  [] Yes   [] No .........  Food Allergy:  [x] Yes   [] No  Which?.peanuts = last time accidentally in March with contaminated ice cream and slight throat swelling --> lip/throat swelling and hives --> proven by skin tests about 10 years ago  Tree nuts: almonds > hazelnuts > pistachios = mouth swelling and hives, but no breathing problems  Shellfish = mouth swelling, fish OK, no problems with fruits  Pets :  [] Yes   [x] No  Which?.RC to dogs and cats         [x]  Rhinitis   [x] Conjunctivitis   [] Sinusitis   [] Polyposis   [] Otitis   [] Pharyngitis         []  Postnasal drip    []  none  Operations:   [] Tonsils   [] Septum   [] Sinus   [] Polyposis        [] Asthma bronchiale   [] Coughing      [x]  none  Symptoms (mostly Rhinoconjunctivitis and Asthma) aggravated by:  Season   [] I   [] II   [] III   [] IV   [x]V   [x]VI   []VII   [x]VIII    [x]IX   []X   []XI   []XII     [] perennial   Day time      [] morning   [] noon      [] evening        [] night    [x] whole day........  []  none  Location/changes    [] inside        [x] outside   [] mountains    [] sea     [] others.............   []  none  Triggers, specific     [x] animals     [x] plants     [] dust              [] others ...........................    []  none  Triggers, others       [] work          [] psyche    [] sport            [] others .............................  [x]  none  Irritant                [] phys efforts [] smoke    [] heat/cold     [] odors  []others............... [x]  none    Order for PATCH TESTS  Reason for tests (suspected allergy): in September/October  Known previous allergies: none  Standardized panels  [x] Standard panel (40 tests)  [x] Preservatives & Antimicrobials (31 tests)  [x] Emulsifiers & Additives (25 tests)   [x] Perfumes/Flavours & Plants (25 tests)  [] Hairdresser panel (12 tests)  [] Rubber Chemicals (22 tests)  [x] Plastics (26 tests)  [] Colorants/Dyes/Food additives (20 tests)  [x] Metals (implants/dental) (24 tests)  [] Local anaesthetics/NSAIDs (13 tests)  [] Antibiotics & Antimycotics (14 tests)   [] Corticosteroids (15 tests)   [] Photopatch test (62 tests)   [] others: ...      [] Patient's own products: ...  DO NOT test if chemical or biological identity is unknown!   always ask from patient the product information and safety sheets (MSDS)       Order for PRICK TESTS    Reason for tests (suspected allergy): atopy with food allergy  Known previous allergies: none    Standardized prick panels  [x] Atopic panel (20 tests)  [] Pediatric Panel (12 tests)  [] Milk, Meat, Eggs, Grains (20 tests)   [] Dust, Epithelia, Feathers (10 tests)  [x] Fish, Seafood, Shellfish (17 tests)  [] Nuts, Beans (14 tests)  [] Spice, Vegetable, Fruit (17 tests)  [] Pollen Panel = Tree, Grass, Weed (24 tests)  [x] Others: peanuts, hazelnuts, almonds.      [] Patient's  own products: ...  DO NOT test if chemical or biological identity is unknown!   always ask from patient the product information and safety sheets (MSDS)     Standardized intradermal tests  [] Penicillium notatum [] Aspergillus fumigatus [] House dust mites D.far & D. pteron  [] Cat    [] dog  [] Others: ...  [] Bee venom   [] Wasp venom  !!Specific protocol with dilutions!!       Order for Drug allergy tests (prick & Intradermal & patch tests)    [] Penicillin G  [] Ampicillin [] Cefazolin   [] Ceftriaxone   [] Ceftazidime  [] Bactrim    [] Others: ...  Order for ... as test date      Atopy Screen (Placed Jul 12, 2023)  No Substance Readings (15 min) Evaluation   POS Histamine 1mg/ml ++    NEG NaCl 0.9% -      No Substance Readings (15 min) Evaluation   1 Alternaria alternata (tenuis)  +    2 Cladosporium herbarum +    3 Aspergillus fumigatus -    4 Penicillium notatum -    5 Dermatophagoides pteronyssinus ++++    6 Dermatophagoides farinae ++++    7 Dog epithelium (canis spp) -    8 Cat hair (shine catus) ++    9 Cockroach   (Blatella americana & germanica) -    10 Grass mix midwest   (Yazmin, Orchard, Redtop, Wesley) ++    11 Sunday grass (sorghum halepense) ++    12 Weed mix   (common Cocklebur, Lamb s quarters, rough redroot Pigweed, Dock/Sorrel) ++    13 Mug wort (artemisia vulgare) ++    14 Ragweed giant/short (ambrosia spp) ++    15      16 Tree mix 1 (Pecan, Maple BHR, Oak RVW, american [place], black [place]) +/++    17 Red cedar (juniperus virginia) +    18 Tree mix 2   (white Gregory, river/red Birch, black [place], common [place], american Elm) ++    19 Box elder/Maple mix (acer spp) +    20 Pala shagbark (carya ovata) ++    Conclusion:    Fish and Seafood (Placed Jul 12, 2023)  No Substance Readings (15min) Evaluation   POS Histamine 1mg/ml ++    NEG NaCl 0.9% -      No Substance Readings (15min) Evaluation   1 peanuts ++++    2 hazelnut +    3 almond +/++    4 pistachios +    11 Crab (callinectes spp)  -    12 Lobster (homarus americanus) -    13 Shrimp white/brown/pink (farfantepenaeus&litopenaeus) -    14 Kingcrab (paralithodes camtschatica) -    15 Scallop (placopecten magellanicus) -    16 Clam (mercenaria mercenaria) -    17 Oyster (cstrea/crassostrea) -    Conclusion:    Nuts and Beans (Placed Sep 27, 2023)  No Substance Readings (15min) Evaluation   POS Histamine 1mg/ml ++    NEG NaCl 0.9% -      No Substance Readings (15min) Evaluation   1 Sunflower (prunus dulcis) ++    2 Brazil nut (bertholletia excels) -    3 Cashew nut (anacardium occidentale) -    4 Coconut (cocos nucifera) -    5 Hazelnut (corylus americana) -    6 Pecan (carya illinoinensis) -    7 Norman (juglans regia) -    8 Pistachio nut (pistacia vera) -    9 peanuts +++    10 Akers Locke (phaseolus lunatus) ++    11 String Locke (phaseolus vulgaris) -    12 Kidney bean (phaseolus vulgaris) -    13 Green Pea (pisum sativum) +/++    14 Soybean (glycine max) (+)    15 Fresh shrimp prick/prick ++    Conclusion: mostly reactions to beans and peanut, less to nuts (except almond)    RESULTS & EVALUATION of PATCH TESTS    Sep 25, 2023 application of patch tests:    Patch test readings after     [x] 2 days, [] 3 days [x] 4 days, [] 5 days,  Other duration: ...    STANDARD Series                                          # Substance 2 days 4 days remarks     1 Garret Mix [C] - -       2 Colophony - -       3  2-Mercaptobenzothiazole  - -       4 Methylisothiazolinone - -       5 Carba Mix - -       6 Thiuram Mix [A] - -       7 Bisphenol A Epoxy Resin - -       8 B-Hyly-Ymeumswniyu-Formaldehyde Resin - -       9 Mercapto Mix [A] - -       10 Black Rubber Mix- PPD [B] - -       11 Potassium Dichromate  -  -       12 Balsam of Peru (Myroxylon Pereirae Resin) - -       13 Nickel Sulphate Hexahydrate - +       14 Mixed Dialkyl Thiourea - -       15 Paraben Mix [B] - -       16 Methyldibromo Glutaronitrile - -       17 Fragrance Mix 8% - -       18  2-Bromo-2-Nitropropane-1,3-Diol (Bronopol) CT - -       19 Lyral - -       20 Tixocortol-21- Pivalate CT - -       21 Diazolidinyl urea (Germall II) - -        22 Methyl Methacrylate - -       23 Cobalt (II) Chloride Hexahydrate (+) -       24 Fragrance Mix II  - -       25 Compositae Mix - -       26 Benzoyl Peroxide - -       27 Bacitracin - -       28 Formaldehyde - -       29 Methylchloroisothiazolinone / Methylisothiazolinone - -       30 Corticosteroid Mix CT - -       31 Sodium Lauryl Sulfate - -       32 Lanolin Alcohol - -       33 Turpentine - -       34 Cetylstearylalcohol - -       35 Chlorhexidine Dicluconate - -       36 Budesonide - -       37 Imidazolidinyl Urea  - -       38 Ethyl-2 Cyanoacrylate NA NA       39 Quaternium 15 (Dowicil 200) - -       40 Decyl Glucoside - -     PRESERVATIVES & ANTIMICROBIALS        # Substance 2 days 4 days remarks   41 1  1,2-Benzisothiazoline-3-One, Sodium Salt - -     2  1,3,5-Pablo (2-Hydroxyethyl) - Hexahydrotriazine (Grotan BK) - -     3 4-Ltfeurukqmvss-7-Nitro-1, 3-Propanediol - -     4  3, 4, 4' - Triclocarban - -    45 5 4 - Chloro - 3 - Cresol - -     6 4 - Chloro - 4 - Xylenol (PCMX) - -     7 7-Ethylbicyclooxazolidine (Bioban [id number]) - -     8 Benzalkonium Chloride CT - -     9 Benzyl Alcohol - -    50 10 Cetalkonium Chloride - -     11 Cetylpyrimidine Chloride  - -     12 Chloroacetamide - -     13 DMDM Hydantoin - -     14 Glutaraldehyde - -    55 15 Triclosan - -     16 Glyoxal Trimeric Dihydrate - -     17 Iodopropynyl Butylcarbamate - -     18 Octylisothiazoline - -     19 Bithionol CT NA NA    60 20 Bioban P 1487 (Nitrobutyl) Morpholine/(Ethylnitro-Trimethylene) Dimorpholine - -     21 Phenoxyethanol - -     22 Phenyl Salicylate - -     23 Povidone Iodine - -     24 Sodium Benzoate - -    65 25 Sodium Disulfite - -     26 Sorbic Acid - -     27 Thimerosal - -     28 Melamine Formaldehyde Resin - -     29 Ethylenediamine Dihydrochloride - -       Parabens      70 30 Butyl-P-Hydroxybenzoate - -     31 Ethyl-P-Hydroxybenzoate - -     32 Methyl-P-Hydroxybenzoate - -    73 33 Propyl-P-Hydroxybenzoate - -    EMULSIFIERS & ADDITIVES       # Substance 2 days 4 days remarks   74 1 Polyethylene Glycol-400 - -    75 2 Cocamidopropyl Betaine - -     3 Amerchol L101 - -     4 Propylene Glycol - -     5 Triethanolamine - -     6 Sorbitane Sesquiolate CT - -    80 7 Isopropylmyristate - -     8 Polysorbate 80 CT - -     9 Amidoamine   (Stearamidopropyl Dimethylamine) - -     10 Oleamidopropyl Dimethylamine - -     11 Lauryl Glucoside - -    85 12 Coconut Diethanolamide  - -     13 2-Hydroxy-4-Methoxy Benzophenone (Oxybenzone) - -     14 Benzophenone-4 (Sulisobenzon) - -     15 Propolis - -     16 Dexpanthenol - -    90 17 Abitol - -     18 Tert-Butylhydroquinone - -     19 Benzyl Salicylate - -     20 Dimethylaminopropylamin (DMPA) - -     21 Zinc Pyrithione (Zinc Omadine)  - -    95 22 Pablo(Hydroxymethyl) Nitromethane  - -      Antioxidant       23 Dodecyl Gallate - -     24 Butylhydroxyanisole (BHA) - -     25 Butylhydroxytoluene (BHT) - -     26 Di-Alpha-Tocopherol (Vit E) - -    100 27 Propyl Gallate - -    PERFUMES, FLAVORS & PLANTS        # Substance 2 days 4 days remarks   101 1 Benzyl Cinnamate - -     2 Di-Limonene (Dipentene) - -     3 Cananga Odorata (Jonelle Sal) (I) - -     4 Lichen Acid Mix - -    105 5 Mentha Piperita Oil (Peppermint Oil) - -     6 Sesquiterpenelactone mix - -     7 Tea Tree Oil, Oxidized - -     8 Wood Tar Mix (+) -     9 Abietic Acid - -    110 10 Lavendula Angustifolia Oil (Lavender Oil) - -     11 Fragrance mix II CT * 14% - -      Fragrance Mix I       12 Oakmoss Absolute - -     13 Eugenol - -     14 Geraniol - -    115 15 Hydroxycitronellal - -     16 Isoeugenol - -     17 Cinnamic Aldehyde - -     18 Cinnamic Alcohol  - -      Fragrance mix II       19 Citronellol - -    120 20 Alpha-Hexylcinnamic Aldehyde    - -     21 Citral - -      22 Farnesol - -    123 23 Coumarin - -    Hexylcinnamic aldehyde, Coumarin, Farnesol, Hydroxyisohexy3-cyclohexene carboxaldehyde, citral, citrolellol  PLASTICS (Acrylates/Epoxy Systems)       # Substance 2 days 4 days remarks     Acrylates - -    124 1 2-Hydroxyethyl Methacrylate (HEMA) - -    125 2 1,4-Butandioldimethacrylate (BUDMA) - -     3  2-Ethylhexyl Acrylate - -     4 Bisphenol-A-Dimethacrylate  - -     5 Diurethane-Dimethacrylate - -     6 Ethyleneglycoldimethacrylate (EGDMA) - -    130 7 Pentaerythritoltriacrylate (MEENU) - -     8 Triethylene Glycol Dimethacrylate (TEGDMA) - -      Synthetic material/additives        9 P-Qpra-Ievelgximex - -     10 Tricresyl Phosphate - -     11 5-Kidw-Vtttpcbfrcnpz - -    135 12 Dioctyl phtalate(DEHP,DOP) / (Dimethylhexylphthalate)  - -     13 Dibutylphthalate - -     14 Dimethylphthalate - -     15 Toluene-2,4-Diisocyanate NA NA     16 Diphenylmethane-4,4''-Diisocyanate NA NA      EPOXY RESIN SYSTEMS        Reactive Solvents - -    140 17 Cresyl Glycidyl Ether NA NA     18 Butyl Glycidyl Ether - -     19 Phenyl Glycidyl Ether - -     20 1,4-Butanediol Diglycidyl Ether - -     21 1,6-Hexanediole Diglycidyl Ether - -      Hardener / Accelerator - -    145 22 Triethylenetetramine - -     23 Diethylenetriamine - -     24 Isophorone Diamine (IPD) - -     25 N,N-Dimethyl-P-Toluidine - -    149 26 Isobornyl Acrylate - -    METALS (Implants / Dental)        # Substance 2 days 4 days remarks   150 1 Ammonium Heptamolybdate (IV) - -     2 Ammonium Tetrachloroplatinate - -     3 Indium (III) Chloride - -     4 Iridium (III) Chloride - -     5 Ferric Chloride - -    155 6 Manganese (II) Chloride - -     7 Niobium (V) Chloride - -     8 Palladium Chloride - -     9 Silver Nitrate - -     10 Gold Sodium Thiosulfate - -    160 11 Tantal - -     12 Tin (II) Chloride - -     13 Titanium (IV) Oxide - -     14 Titanium - -     15 Tungstic Acid, Sod Salt Dihydrat - -    165 16 Vanadium  Pentoxide - -     17 Wolfram - -     18 Zinc Chloride - -     19 Zirconium (IV) Oxide - -     20 Ammoniated Murcury - -    170 21 Copper Sulfate Pentahydrate (+) -     22 Amalgam  - -     23 Aluminum Hydroxide - -    173 24 Ammonium Hexachloroplatinate - -      Results of patch tests:                         Interpretation:  - Negative                    A    = Allergic      (+) Erythema    TI   = Toxic/irritant   + E + Infiltration    RaP = Relevance at Present     ++ E/I + Papulovesicle   Rpr  = Relevance Previously     +++ E/I/P + Blister     nR   = No Relevance    [x] No relevant allergic reaction observed  Slight reaction to Nickel    [] Allergic reaction diagnosed against following allergens:    Interpretation/Remarks:   No clear contact sensitization in patch tests, but patient in prick tests and by hx very atopic and the borderline Nickel allergy fits well into this picture. Dermatitis is therefore mostly atopic dermatitis.     [x] Patient information given   [] ACDS CAMP information (# ....) to following compounds: .....   [x] General information to following compounds: Nickel    ____________________________________________     Assessment & Plan:    ==> Final Diagnosis:     # Atopic predisposition with:  Seasonal RC in late spring (tree pollens), summer (grass), and fall (weed pollens)  Sensitization to dust mites --> might explain cold symptoms in winter  Food allergies to peanuts >>> tree nuts ==> throat swelling and urticaria  Shellfish allergy with oral itching  Positive family history  Maybe atopic dermatitis with dry skin and recurrent periorbital dermatitis  * chronic illness with exacerbation, progression, side effects from treatment    # Recurrent periorbital dermatitis right side and scalp dermatitis  >> mostly atopic dermatitis with irritant component  No signs for relevant contact sensitization (slightly to Nickel)  * chronic illness with exacerbation, progression, side effects from  treatment    These conclusions are made at the best of one's knowledge and belief based on the provided evidence such as patient's history and allergy test results and they can change over time or can be incomplete because of missing information.    ==> Treatment Plan:    >> Refilled prednisone for emergency set if needed again in the future (rest of emergency set re-prescribed at patient's request on 5/28/24).    >> Continue with regular moisturization after each shower.  - Try to avoid overly-scented products.    >> Continue with ketoconazole shampoo and Clobex shampoo each 2x/week, alternating.    >> Detailed discussion regarding subcutaneous allergy immunotherapy for allergies.   - Will refer patient for consultation with allergist Dr. Jean regarding allergy shots for pollen and dust mite allergies; will not repeat testing today, as it likely will be completed with Dr. Jean.   - Prescribed azelastine 0.05% eye drops PRN; apply 1 drop to eye 2 times daily  - Prescribed azelastine 0.1% nasal spray; spray 1 spray into both nostrils 2 times daily as needed (during pollen season)  - Prescribed Allegra 180 mg instead of Zyrtec; take 1 tablet (180 mg) by mouth daily as needed for allergies (during pollen seasonal 1-2 Tabl daily)    Procedures Performed: none    Staff Involved: Provider, Staff, Resident, and Scribe    Scribe Disclosure:   I, Yaya Ridley, am serving as a scribe to document services personally performed by Donell Ho MD based on data collection and the provider's statements to me.     Staff Physician Comments:  I was present with the scribe who participated in the documentation of the note. I have verified the history and personally performed the physical exam and medical decision making. I agree with the assessment and plan as documented in the note. I have reviewed and if necessary amended the note.      Donell Ho MD  Professor  Head of Dermato-Allergy Division  Department of  Dermatology  [facility]      Follow-up in Derm-Allergy clinic if necessary; follow with Dr. Jean  ___________________________    I spent a total of 20 minutes with Valentin Gaytan during today s  visit. This time was spent discussing all the individual test results, correlating them to the clinical relevance, counseling the patient and/or coordinating care and performing allergy tests or procedures.        Again, thank you for allowing me to participate in the care of your patient.        Sincerely,        Donell Ho MD    Electronically signed

## 2025-08-07 ENCOUNTER — OFFICE VISIT (OUTPATIENT)
Dept: ALLERGY | Facility: CLINIC | Age: 20
End: 2025-08-07
Payer: COMMERCIAL

## 2025-08-07 VITALS
OXYGEN SATURATION: 99 % | DIASTOLIC BLOOD PRESSURE: 73 MMHG | BODY MASS INDEX: 21.02 KG/M2 | HEART RATE: 63 BPM | WEIGHT: 155.7 LBS | SYSTOLIC BLOOD PRESSURE: 133 MMHG

## 2025-08-07 DIAGNOSIS — J30.1 SEASONAL ALLERGIC RHINITIS DUE TO POLLEN: ICD-10-CM

## 2025-08-07 DIAGNOSIS — T78.1XXA ADVERSE FOOD REACTION, INITIAL ENCOUNTER: Primary | ICD-10-CM

## 2025-08-07 DIAGNOSIS — T78.1XXA ALLERGIC REACTION TO PEANUT: ICD-10-CM
